# Patient Record
Sex: FEMALE | Race: WHITE | Employment: OTHER | URBAN - METROPOLITAN AREA
[De-identification: names, ages, dates, MRNs, and addresses within clinical notes are randomized per-mention and may not be internally consistent; named-entity substitution may affect disease eponyms.]

---

## 2018-11-29 ENCOUNTER — HOSPITAL ENCOUNTER (OUTPATIENT)
Dept: WOMENS IMAGING | Age: 68
Discharge: HOME | End: 2018-12-01
Payer: MEDICARE

## 2018-11-29 DIAGNOSIS — M81.6 LOCALIZED OSTEOPOROSIS, UNSPECIFIED PATHOLOGICAL FRACTURE PRESENCE: ICD-10-CM

## 2018-11-29 DIAGNOSIS — Z12.39 BREAST CANCER SCREENING: ICD-10-CM

## 2018-11-29 PROCEDURE — 77080 DXA BONE DENSITY AXIAL: CPT

## 2018-11-29 PROCEDURE — 77067 SCR MAMMO BI INCL CAD: CPT

## 2019-09-12 ENCOUNTER — HOSPITAL ENCOUNTER (OUTPATIENT)
Dept: GENERAL RADIOLOGY | Age: 69
Discharge: HOME | End: 2019-09-14
Payer: MEDICARE

## 2019-09-12 DIAGNOSIS — M54.5 LOW BACK PAIN, UNSPECIFIED BACK PAIN LATERALITY, UNSPECIFIED CHRONICITY, WITH SCIATICA PRESENCE UNSPECIFIED: ICD-10-CM

## 2019-09-12 PROCEDURE — 72110 X-RAY EXAM L-2 SPINE 4/>VWS: CPT

## 2019-11-08 ENCOUNTER — OFFICE VISIT (OUTPATIENT)
Dept: GERIATRIC MEDICINE | Age: 69
End: 2019-11-08
Payer: MEDICARE

## 2019-11-08 DIAGNOSIS — M48.061 SPINAL STENOSIS OF LUMBAR REGION WITHOUT NEUROGENIC CLAUDICATION: Primary | ICD-10-CM

## 2019-11-08 DIAGNOSIS — G35 MS (MULTIPLE SCLEROSIS) (HCC): ICD-10-CM

## 2019-11-08 DIAGNOSIS — I95.1 ORTHOSTASIS: ICD-10-CM

## 2019-11-08 DIAGNOSIS — M15.9 OSTEOARTHRITIS OF MULTIPLE JOINTS, UNSPECIFIED OSTEOARTHRITIS TYPE: ICD-10-CM

## 2019-11-08 PROCEDURE — 1123F ACP DISCUSS/DSCN MKR DOCD: CPT | Performed by: INTERNAL MEDICINE

## 2019-11-08 PROCEDURE — 99305 1ST NF CARE MODERATE MDM 35: CPT | Performed by: INTERNAL MEDICINE

## 2019-11-08 PROCEDURE — G8484 FLU IMMUNIZE NO ADMIN: HCPCS | Performed by: INTERNAL MEDICINE

## 2019-11-11 LAB
ANION GAP SERPL CALCULATED.3IONS-SCNC: 9 MEQ/L (ref 9–15)
BUN BLDV-MCNC: 13 MG/DL (ref 8–23)
CALCIUM SERPL-MCNC: 9.4 MG/DL (ref 8.5–9.9)
CHLORIDE BLD-SCNC: 100 MEQ/L (ref 95–107)
CO2: 31 MEQ/L (ref 20–31)
CREAT SERPL-MCNC: 0.37 MG/DL (ref 0.5–0.9)
GFR AFRICAN AMERICAN: >60
GFR NON-AFRICAN AMERICAN: >60
GLUCOSE BLD-MCNC: 91 MG/DL (ref 70–99)
HCT VFR BLD CALC: 38.8 % (ref 37–47)
HEMOGLOBIN: 12.7 G/DL (ref 12–16)
MCH RBC QN AUTO: 31.2 PG (ref 27–31.3)
MCHC RBC AUTO-ENTMCNC: 32.8 % (ref 33–37)
MCV RBC AUTO: 95.1 FL (ref 82–100)
PDW BLD-RTO: 12.4 % (ref 11.5–14.5)
PLATELET # BLD: 274 K/UL (ref 130–400)
POTASSIUM SERPL-SCNC: 4.7 MEQ/L (ref 3.4–4.9)
RBC # BLD: 4.08 M/UL (ref 4.2–5.4)
SODIUM BLD-SCNC: 140 MEQ/L (ref 135–144)
WBC # BLD: 3.8 K/UL (ref 4.8–10.8)

## 2019-11-27 VITALS — HEART RATE: 68 BPM | RESPIRATION RATE: 14 BRPM | TEMPERATURE: 98.6 F

## 2019-12-09 ENCOUNTER — HOSPITAL ENCOUNTER (OUTPATIENT)
Dept: WOMENS IMAGING | Age: 69
Discharge: HOME | End: 2019-12-11
Payer: MEDICARE

## 2019-12-09 DIAGNOSIS — Z12.31 BREAST CANCER SCREENING BY MAMMOGRAM: ICD-10-CM

## 2019-12-09 PROCEDURE — 77067 SCR MAMMO BI INCL CAD: CPT

## 2022-06-20 ENCOUNTER — HOSPITAL ENCOUNTER (EMERGENCY)
Age: 72
Discharge: HOME | End: 2022-06-20
Attending: EMERGENCY MEDICINE
Payer: MEDICARE

## 2022-06-20 ENCOUNTER — APPOINTMENT (OUTPATIENT)
Dept: CT IMAGING | Age: 72
End: 2022-06-20
Payer: MEDICARE

## 2022-06-20 VITALS
OXYGEN SATURATION: 99 % | SYSTOLIC BLOOD PRESSURE: 138 MMHG | HEIGHT: 66 IN | RESPIRATION RATE: 18 BRPM | WEIGHT: 121 LBS | DIASTOLIC BLOOD PRESSURE: 70 MMHG | TEMPERATURE: 98 F | BODY MASS INDEX: 19.44 KG/M2 | HEART RATE: 81 BPM

## 2022-06-20 DIAGNOSIS — S33.5XXA LUMBAR SPRAIN, INITIAL ENCOUNTER: Primary | ICD-10-CM
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
## 2022-06-20 PROCEDURE — 99284 EMERGENCY DEPT VISIT MOD MDM: CPT

## 2022-06-20 PROCEDURE — 96372 THER/PROPH/DIAG INJ SC/IM: CPT

## 2022-06-20 PROCEDURE — 72131 CT LUMBAR SPINE W/O DYE: CPT

## 2022-06-20 PROCEDURE — 6360000002 HC RX W HCPCS: Performed by: EMERGENCY MEDICINE

## 2022-06-20 RX ORDER — KETOROLAC TROMETHAMINE 30 MG/ML
30 INJECTION, SOLUTION INTRAMUSCULAR; INTRAVENOUS ONCE
Status: COMPLETED | OUTPATIENT
Start: 2022-06-20 | End: 2022-06-20

## 2022-06-20 RX ORDER — KETOROLAC TROMETHAMINE 10 MG/1
10 TABLET, FILM COATED ORAL EVERY 6 HOURS PRN
Qty: 20 TABLET | Refills: 0 | Status: SHIPPED | OUTPATIENT
Start: 2022-06-20

## 2022-06-20 RX ORDER — FLUDROCORTISONE ACETATE 0.1 MG/1
0.1 TABLET ORAL DAILY
COMMUNITY

## 2022-06-20 RX ADMIN — KETOROLAC TROMETHAMINE 30 MG: 30 INJECTION, SOLUTION INTRAMUSCULAR; INTRAVENOUS at 21:33

## 2022-06-20 ASSESSMENT — ENCOUNTER SYMPTOMS
DIARRHEA: 0
PHOTOPHOBIA: 0
BACK PAIN: 0
WHEEZING: 0
CONSTIPATION: 0
APNEA: 0
ABDOMINAL DISTENTION: 0
SINUS PRESSURE: 0
VOMITING: 0
COLOR CHANGE: 0
RHINORRHEA: 0
NAUSEA: 0
SORE THROAT: 0
SHORTNESS OF BREATH: 0
COUGH: 0
ABDOMINAL PAIN: 0
EYE PAIN: 0

## 2022-06-20 ASSESSMENT — PAIN - FUNCTIONAL ASSESSMENT: PAIN_FUNCTIONAL_ASSESSMENT: 0-10

## 2022-06-20 ASSESSMENT — PAIN DESCRIPTION - ORIENTATION
ORIENTATION: LEFT
ORIENTATION: LEFT

## 2022-06-20 ASSESSMENT — PAIN DESCRIPTION - DESCRIPTORS
DESCRIPTORS: SHARP
DESCRIPTORS: SHARP;SHOOTING

## 2022-06-20 ASSESSMENT — PAIN SCALES - GENERAL
PAINLEVEL_OUTOF10: 5
PAINLEVEL_OUTOF10: 4

## 2022-06-20 ASSESSMENT — PAIN DESCRIPTION - LOCATION
LOCATION: SACRUM
LOCATION: SACRUM

## 2022-06-20 ASSESSMENT — PAIN DESCRIPTION - PAIN TYPE: TYPE: ACUTE PAIN

## 2022-06-21 NOTE — ED PROVIDER NOTES
[facility] ED  eMERGENCY dEPARTMENT eNCOUnter      Pt Name: Sulaiman Fitch  MRN: [id number]  [place] 1950  Date of evaluation: 6/20/2022  Provider: Toribio Maza MD    CHIEF COMPLAINT       Chief Complaint   Patient presents with   [facility] Fall     Was walking with rollator and lost balance and fell backwards and landed on butt. Now c/o pain in the left sacral area with numbness but states \"Thats not new, I always have numbness due to the MS. \"         HISTORY OF PRESENT ILLNESS   (Location/Symptom, Timing/Onset,Context/Setting, Quality, Duration, Modifying Factors, Severity)  Note limiting factors. Sulaiman Fitch is a 80 y.o. female who presents to the emergency department with complaint of left lower back pain following a fall at home 1:30 PM this afternoon. Patient has history of MS and has had issues with recurrent falls. She lost balance and fell backwards landing on her buttocks. Denies head injury with the fall. Pain was not immediate. She however after a few hours was not able to stand for long periods without shooting pain down the left leg. Pain is 5 on a scale of 1-10 and sharp. Has issues with extremity weakness and numbness but left side has gotten worse with the fall. HPI    Nursing Notes were reviewed. REVIEW OF SYSTEMS    (2-9 systems for level 4, 10 or more for level 5)     Review of Systems   Constitutional: Negative. Negative for activity change, appetite change, chills, fatigue and fever. HENT: Negative for congestion, ear discharge, ear pain, hearing loss, rhinorrhea, sinus pressure and sore throat. Eyes: Negative for photophobia, pain and visual disturbance. Respiratory: Negative for apnea, cough, shortness of breath and wheezing. Cardiovascular: Negative for chest pain, palpitations and leg swelling. Gastrointestinal: Negative for abdominal distention, abdominal pain, constipation, diarrhea, nausea and vomiting.    Endocrine: Negative for cold intolerance, heat intolerance and polyuria. Genitourinary: Negative for dysuria, flank pain, frequency and urgency. Musculoskeletal: Positive for arthralgias and gait problem. Negative for back pain, myalgias and neck stiffness. Skin: Negative for color change, pallor, rash and wound. Allergic/Immunologic: Negative for food allergies and immunocompromised state. Neurological: Negative for dizziness, tremors, syncope, weakness, light-headedness and headaches. Hematological: Negative for adenopathy. Does not bruise/bleed easily. Psychiatric/Behavioral: Negative for agitation, confusion, hallucinations, self-injury, sleep disturbance and suicidal ideas. The patient is not hyperactive. All other systems reviewed and are negative. Except as noted above the remainder of the review of systems was reviewed and negative. PAST MEDICAL HISTORY     Past Medical History:   Diagnosis Date    Multiple sclerosis ([facility] [address]          SURGICAL HISTORY     No past surgical history on file. CURRENT MEDICATIONS       Previous Medications    FLUDROCORTISONE (FLORINEF) 0.1 MG TABLET    Take 0.1 mg by mouth daily       ALLERGIES     Sulfa antibiotics    FAMILY HISTORY     No family history on file. SOCIAL HISTORY       Social History     Socioeconomic History    Marital status:       Spouse name: Not on file    Number of children: Not on file    Years of education: Not on file    Highest education level: Not on file   Occupational History    Not on file   Tobacco Use    Smoking status: Not on file    Smokeless tobacco: Not on file   Substance and Sexual Activity    Alcohol use: Not on file    Drug use: Not on file    Sexual activity: Not on file   Other Topics Concern    Not on file   Social History Narrative    Not on file     Social Determinants of Health     Financial Resource Strain:     Difficulty of Paying Living Expenses: Not on file   Food Insecurity:     Worried About Running Out of Food in the Last Year: Not on file    Ran Out of Food in the Last Year: Not on file   Transportation Needs:     Lack of Transportation (Medical): Not on file    Lack of Transportation (Non-Medical): Not on file   Physical Activity:     Days of Exercise per Week: Not on file    Minutes of Exercise per Session: Not on file   Stress:     Feeling of Stress : Not on file   Social Connections:     Frequency of Communication with Friends and Family: Not on file    Frequency of Social Gatherings with Friends and Family: Not on file    Attends Latter day Services: Not on file    Active Member of [address] [place] [address] [employer] or Organizations: Not on file    Attends Club or Organization Meetings: Not on file    Marital Status: Not on file   Intimate Partner Violence:     Fear of Current or Ex-Partner: Not on file    Emotionally Abused: Not on file    Physically Abused: Not on file    Sexually Abused: Not on file   Housing Stability:     Unable to Pay for Housing in the Last Year: Not on file    Number of [place] in the Last Year: Not on file    Unstable Housing in the Last Year: Not on file       SCREENINGS    [place] Coma Scale  Eye Opening: Spontaneous  Best Verbal Response: Oriented  Best Motor Response: Obeys commands  [place] Coma Scale Score: 15        PHYSICAL EXAM    (up to 7 for level 4, 8 or more for level 5)     ED Triage Vitals [06/20/22 1933]   BP Temp Temp src Heart Rate Resp SpO2 Height Weight   (!) 146/72 98 °F (36.7 °C) -- 79 18 -- 5' 6\" (1.676 m) 121 lb (54.9 kg)       Physical Exam  Vitals and nursing note reviewed. Constitutional:       General: She is not in acute distress. Appearance: Normal appearance. She is well-developed and normal weight. She is not ill-appearing, toxic-appearing or diaphoretic. HENT:      Head: Normocephalic and atraumatic. Nose: Nose normal. No congestion or rhinorrhea. Mouth/Throat:      Mouth: Mucous membranes are moist.      Pharynx: Oropharynx is clear. No oropharyngeal exudate or posterior oropharyngeal erythema. Eyes:      General: No scleral icterus. Right eye: No discharge. Left eye: No discharge. Extraocular Movements: Extraocular movements intact. Conjunctiva/sclera: Conjunctivae normal.      Pupils: Pupils are equal, round, and reactive to light. Neck:      Thyroid: No thyromegaly. Vascular: No carotid bruit or JVD. Trachea: No tracheal deviation. Cardiovascular:      Rate and Rhythm: Normal rate and regular rhythm. Pulses: Normal pulses. Heart sounds: Normal heart sounds. No murmur heard. No friction rub. No gallop. Pulmonary:      Effort: Pulmonary effort is normal. No respiratory distress. Breath sounds: Normal breath sounds. No stridor. No wheezing, rhonchi or rales. Chest:      Chest wall: No tenderness. Abdominal:      General: Abdomen is flat. Bowel sounds are normal. There is no distension. Palpations: Abdomen is soft. There is no mass. Tenderness: There is no abdominal tenderness. There is no right CVA tenderness, left CVA tenderness, guarding or rebound. Hernia: No hernia is present. Musculoskeletal:         General: No swelling, tenderness, deformity or signs of injury. Normal range of motion. Cervical back: Normal range of motion and neck supple. No rigidity or tenderness. Right lower leg: No edema. Left lower leg: No edema. Lymphadenopathy:      Cervical: No cervical adenopathy. Skin:     General: Skin is warm and dry. Capillary Refill: Capillary refill takes less than 2 seconds. Coloration: Skin is not jaundiced or pale. Findings: No bruising, erythema, lesion or rash. Neurological:      General: No focal deficit present. Mental Status: She is alert and oriented to person, place, and time. Mental status is at baseline. Cranial Nerves: No cranial nerve deficit. Sensory: No sensory deficit.       Motor: No weakness or abnormal muscle tone. Coordination: Coordination normal.      Gait: Gait abnormal.      Deep Tendon Reflexes: Reflexes are normal and symmetric. Reflexes normal.   Psychiatric:         Mood and Affect: Mood normal.         Behavior: Behavior normal.         Thought Content: Thought content normal.         Judgment: Judgment normal.         DIAGNOSTIC RESULTS     EKG: All EKG's are interpreted by the Emergency Department Physician who either signs or Co-signs this chart in the absence of a cardiologist.        RADIOLOGY:   Non-plain film images such as CT, Ultrasound and MRI are read by the radiologist. Diane Ariza radiographicimages are visualized and preliminarily interpreted by the emergency physician with the below findings:        Interpretation per the Radiologist below, if available at the time of this note:    Severo    (Results Pending)         ED BEDSIDE ULTRASOUND:   Performed by ED Physician - none    LABS:  Labs Reviewed - No data to display    All other labs were within normal range or not returned as of this dictation. EMERGENCY DEPARTMENT COURSE and DIFFERENTIALDIAGNOSIS/MDM:   Vitals:    Vitals:    06/20/22 1933   BP: (!) 146/72   Pulse: 79   Resp: 18   Temp: 98 °F (36.7 °C)   Weight: 121 lb (54.9 kg)   Height: 5' 6\" (1.676 m)           MDM  Number of Diagnoses or Management Options     Amount and/or Complexity of Data Reviewed  Tests in the radiology section of CPT®: reviewed and ordered    Risk of Complications, Morbidity, and/or Mortality  Presenting problems: moderate  Diagnostic procedures: moderate  Management options: moderate    Patient Progress  Patient progress: improved      CRITICAL CARE TIME   Total Critical Care time was  minutes, excluding separately reportable procedures. There was a high probability of clinically significant/life threatening deterioration in the patient's condition which required my urgentintervention. CONSULTS:  None    PROCEDURES:  Unless otherwise noted below, none     Procedures    FINAL IMPRESSION      1. Lumbar sprain, initial encounter    2.  Fall, initial encounter          DISPOSITION/PLAN   DISPOSITION Decision To Discharge 06/20/2022 10:15:40 PM      PATIENT REFERRED TO:  Jamie Bautista MD  [address]  Aravind Moore [address]  [phone]    In 3 days        DISCHARGE MEDICATIONS:  New Prescriptions    KETOROLAC (TORADOL) 10 MG TABLET    Take 1 tablet by mouth every 6 hours as needed for Pain          (Please note that portions of this note were completed with a voice recognitionprogram.  Efforts were made to edit the dictations but occasionally words are mis-transcribed.)    Genevieve Huggins MD (electronically signed)  Attending Emergency Physician          Genevieve Huggins MD  06/20/22 2383

## 2022-11-01 ENCOUNTER — OFFICE VISIT (OUTPATIENT)
Dept: GERIATRIC MEDICINE | Age: 72
End: 2022-11-01
Payer: MEDICARE

## 2022-11-01 DIAGNOSIS — G35 MS (MULTIPLE SCLEROSIS) (HCC): ICD-10-CM

## 2022-11-01 PROCEDURE — 1090F PRES/ABSN URINE INCON ASSESS: CPT | Performed by: INTERNAL MEDICINE

## 2022-11-01 PROCEDURE — 1123F ACP DISCUSS/DSCN MKR DOCD: CPT | Performed by: INTERNAL MEDICINE

## 2022-11-01 PROCEDURE — G8484 FLU IMMUNIZE NO ADMIN: HCPCS | Performed by: INTERNAL MEDICINE

## 2022-11-01 PROCEDURE — G8420 CALC BMI NORM PARAMETERS: HCPCS | Performed by: INTERNAL MEDICINE

## 2022-11-15 ENCOUNTER — OFFICE VISIT (OUTPATIENT)
Dept: GERIATRIC MEDICINE | Age: 72
End: 2022-11-15
Payer: MEDICARE

## 2022-11-15 DIAGNOSIS — R53.1 WEAKNESS: ICD-10-CM

## 2022-11-15 DIAGNOSIS — G35 MS (MULTIPLE SCLEROSIS) (HCC): Primary | ICD-10-CM

## 2022-11-15 DIAGNOSIS — I95.1 ORTHOSTASIS: ICD-10-CM

## 2022-11-15 DIAGNOSIS — R63.4 WEIGHT LOSS: ICD-10-CM

## 2022-11-15 DIAGNOSIS — G89.4 CHRONIC PAIN SYNDROME: ICD-10-CM

## 2022-11-15 DIAGNOSIS — M48.02 CERVICAL SPINAL STENOSIS: ICD-10-CM

## 2022-11-15 PROCEDURE — G8484 FLU IMMUNIZE NO ADMIN: HCPCS | Performed by: INTERNAL MEDICINE

## 2022-11-15 PROCEDURE — 1090F PRES/ABSN URINE INCON ASSESS: CPT | Performed by: INTERNAL MEDICINE

## 2022-11-15 PROCEDURE — 1123F ACP DISCUSS/DSCN MKR DOCD: CPT | Performed by: INTERNAL MEDICINE

## 2022-11-15 PROCEDURE — G8420 CALC BMI NORM PARAMETERS: HCPCS | Performed by: INTERNAL MEDICINE

## 2022-12-01 PROBLEM — G35 MS (MULTIPLE SCLEROSIS) (HCC): Status: ACTIVE | Noted: 2022-12-01

## 2022-12-01 PROBLEM — R63.4 WEIGHT LOSS: Status: ACTIVE | Noted: 2022-12-01

## 2022-12-01 PROBLEM — M15.9 OSTEOARTHRITIS OF MULTIPLE JOINTS: Status: ACTIVE | Noted: 2022-12-01

## 2022-12-01 PROBLEM — R25.1 TREMOR: Status: ACTIVE | Noted: 2022-12-01

## 2022-12-01 NOTE — PROGRESS NOTES
SUBJECTIVE:  67-year-old woman transfer service seen today's visit for review of her multiple sclerosis. Patient has ongoing concerns regarding transfer of care follow-up with neurology. Patient has had a functional decline and is currently asking whether or not she can be seen in house possibility of local neurological care. Patient has not any acute flare at this time      ROS: Slight tremor without chest palpitation  The rest of the 14 point ROS negative    PHYSICAL EXAM: VSS per facility record  Frail appearance pupils are small oral mucosa was moist oral mucosa moist chest showed no crackles no wheezing cardiovascular showed a regular rate abdomen soft nontender    ASSESSMENT & PLAN:   Diagnosis Orders   1. MS (multiple sclerosis) ([facility])            Currently not on disease modifying medications will refer back to neurology for evaluation with outpatient benefit from immune modulating agent          Past Medical History:   Diagnosis Date    Chronic pain syndrome     Multiple sclerosis ([facility] [address]     Orthostatic hypotension     Osteoporosis 05/29/2018    age-related w/o current pathological fracture    Other lack of coordination     Spinal stenosis, cervical region     Unspecified retinal detachment with retinal break, left eye          No past surgical history on file. Current Outpatient Medications on File Prior to Visit   Medication Sig Dispense Refill    fludrocortisone (FLORINEF) 0.1 MG tablet Take 0.1 mg by mouth daily      ketorolac (TORADOL) 10 MG tablet Take 1 tablet by mouth every 6 hours as needed for Pain 20 tablet 0     No current facility-administered medications on file prior to visit. No family history on file. Social History     Socioeconomic History    Marital status:       Spouse name: Not on file    Number of children: Not on file    Years of education: Not on file    Highest education level: Not on file   Occupational History    Not on file   Tobacco Use    Smoking status: Not on file    Smokeless tobacco: Not on file   Substance and Sexual Activity    Alcohol use: Not on file    Drug use: Not on file    Sexual activity: Not on file   Other Topics Concern    Not on file   Social History Narrative    Not on file     Social Determinants of Health     Financial Resource Strain: Not on file   Food Insecurity: Not on file   Transportation Needs: Not on file   Physical Activity: Not on file   Stress: Not on file   Social Connections: Not on file   Intimate Partner Violence: Not on file   Housing Stability: Not on file         Lab Results   Component Value Date    LABA1C 5.3 09/01/2022     No results found for: EAG    Lab Results   Component Value Date/Time     11/04/2022 09:47 AM    K 4.2 11/04/2022 09:47 AM     11/04/2022 09:47 AM    CO2 24 11/04/2022 09:47 AM    BUN 19 11/04/2022 09:47 AM    CREATININE 0.72 11/04/2022 09:47 AM    GLUCOSE 84 11/04/2022 09:47 AM    CALCIUM 9.1 11/04/2022 09:47 AM        Lab Results   Component Value Date    CHOL 208 (H) 09/01/2022    CHOL 210 (H) 02/01/2022    CHOL 173 08/11/2020     Lab Results   Component Value Date    TRIG 100 09/01/2022    TRIG 217 (H) 02/01/2022    TRIG 71 08/11/2020     Lab Results   Component Value Date    HDL 72 (H) 09/01/2022    HDL 62 (H) 02/01/2022    HDL 66 (H) 08/11/2020     Lab Results   Component Value Date    LDLCALC 116 09/01/2022    LDLCALC 105 02/01/2022    LDLCALC 93 08/11/2020     No results found for: LABVLDL, VLDL  No results found for: [facility]    Lab Results   Component Value Date    TSH 1.160 08/11/2020       Lab Results   Component Value Date    WBC 3.5 (L) 09/01/2022    HGB 14.3 09/01/2022    HCT 44.1 09/01/2022    MCV 97.0 09/01/2022     09/01/2022       Please note orders entered on site at facility after discussion with appropriate facility nursing/therapy/ / nutritional staff. Current longstanding medical problems and acute medical issues addressed with staff.  Available data and data elements in on site paper chart reviewed and analyzed. Current external consultant notes reviewed in on site chart. Ordered laboratory testing and imaging will be reviewed when available.

## 2022-12-06 ENCOUNTER — OFFICE VISIT (OUTPATIENT)
Dept: GERIATRIC MEDICINE | Age: 72
End: 2022-12-06
Payer: MEDICARE

## 2022-12-06 DIAGNOSIS — R25.1 TREMOR: ICD-10-CM

## 2022-12-06 DIAGNOSIS — R63.4 WEIGHT LOSS: ICD-10-CM

## 2022-12-06 DIAGNOSIS — M15.9 OSTEOARTHRITIS OF MULTIPLE JOINTS, UNSPECIFIED OSTEOARTHRITIS TYPE: ICD-10-CM

## 2022-12-06 DIAGNOSIS — G35 MS (MULTIPLE SCLEROSIS) (HCC): Primary | ICD-10-CM

## 2022-12-06 DIAGNOSIS — R26.81 UNSTEADINESS: ICD-10-CM

## 2022-12-06 DIAGNOSIS — M48.02 CERVICAL SPINAL STENOSIS: ICD-10-CM

## 2022-12-06 PROCEDURE — 1123F ACP DISCUSS/DSCN MKR DOCD: CPT | Performed by: INTERNAL MEDICINE

## 2022-12-06 PROCEDURE — G8420 CALC BMI NORM PARAMETERS: HCPCS | Performed by: INTERNAL MEDICINE

## 2022-12-06 PROCEDURE — G8484 FLU IMMUNIZE NO ADMIN: HCPCS | Performed by: INTERNAL MEDICINE

## 2022-12-06 PROCEDURE — 1090F PRES/ABSN URINE INCON ASSESS: CPT | Performed by: INTERNAL MEDICINE

## 2022-12-15 PROBLEM — H33.22 LEFT RETINAL DETACHMENT: Status: ACTIVE | Noted: 2022-12-15

## 2022-12-15 PROBLEM — M48.02 CERVICAL SPINAL STENOSIS: Status: ACTIVE | Noted: 2022-12-15

## 2022-12-15 PROBLEM — I95.1 ORTHOSTASIS: Status: ACTIVE | Noted: 2022-12-15

## 2022-12-15 PROBLEM — G89.4 CHRONIC PAIN SYNDROME: Status: ACTIVE | Noted: 2022-12-15

## 2022-12-15 ASSESSMENT — ENCOUNTER SYMPTOMS
CONSTIPATION: 1
COUGH: 1

## 2022-12-15 NOTE — PROGRESS NOTES
Patient Name: Ld Nelson    YOB: 1950  Medical Record Number: [id number]      History of Present Illness: This 30-year-old woman transferred to our service from Dr. Morro Adams. Patient has been functionally dependent in the past however has ongoing decline she lives in assisted living at this time within the facility. Patient has had progressive multiple sclerosis. She is still mobile however is globally unsteady patient has had near falls. Has had falls in the last year. Without acute significant head injury. Patient is largely pain-free. She does have some intermittent tremoring. Patient has been followed by neurology in the past.  Has been previously on disease modifying medication. Has compounded issues of cervical stenosis weakness orthostasis patient has had a prior ischemic stroke. Has had lumbar stenosis. Patient has been cognizant of her diet has been attempting nonpharmacological interventions to ameliorate her multiple sclerosis. Function stable without evidence of aggressive flare at this time. She has had questionable subjective decreasing functional extremities. There is no concern for myelopathy and lumbar region? A lot of contraindicated. Patient currently is functionally stable. Pain-free patient is cognitively intact at her baseline. Review of Systems   Constitutional:  Positive for activity change, fatigue and unexpected weight change. Negative for appetite change. HENT:  Negative for congestion. Respiratory:  Positive for cough. Cardiovascular:  Negative for leg swelling. Gastrointestinal:  Positive for constipation. Musculoskeletal:  Positive for gait problem and myalgias. Skin:  Positive for pallor. Neurological:  Positive for tremors and weakness. Psychiatric/Behavioral:  Negative for agitation, behavioral problems and confusion. All other systems reviewed and are negative.     Review of Systems: All 14 review of systems negative other than as stated above           Past Medical History:   Diagnosis Date    Chronic pain syndrome     Multiple sclerosis (HCC)     Orthostatic hypotension     Osteoporosis 05/29/2018    age-related w/o current pathological fracture    Other lack of coordination     Spinal stenosis, cervical region     Unspecified retinal detachment with retinal break, left eye            No past surgical history on file. Current Outpatient Medications on File Prior to Visit   Medication Sig Dispense Refill    fludrocortisone (FLORINEF) 0.1 MG tablet Take 0.1 mg by mouth daily      ketorolac (TORADOL) 10 MG tablet Take 1 tablet by mouth every 6 hours as needed for Pain 20 tablet 0     No current facility-administered medications on file prior to visit. Allergies   Allergen Reactions    Sulfa Antibiotics Rash         No family history on file. Physical Exam:      Physical Exam  Vitals and nursing note reviewed. Constitutional:       Appearance: Normal appearance. HENT:      Head: Normocephalic and atraumatic. Nose: Nose normal.      Mouth/Throat:      Mouth: Mucous membranes are moist.   Eyes:      Pupils: Pupils are equal, round, and reactive to light. Cardiovascular:      Rate and Rhythm: Normal rate and regular rhythm. Pulses: Normal pulses. Pulmonary:      Effort: Pulmonary effort is normal.      Breath sounds: No wheezing. Abdominal:      General: Abdomen is flat. Bowel sounds are normal.      Tenderness: There is no abdominal tenderness. Musculoskeletal:         General: No swelling. Cervical back: Neck supple. Lymphadenopathy:      Cervical: No cervical adenopathy. Skin:     General: Skin is warm. Findings: No bruising. Neurological:      Sensory: Sensory deficit present. Motor: Weakness present. Gait: Gait abnormal.   Psychiatric:         Mood and Affect: Mood normal.       There were no vitals taken for this visit.       .   Lab Results   Component Value Date    WBC 3.5 (L) 09/01/2022    HGB 14.3 09/01/2022    HCT 44.1 09/01/2022    MCV 97.0 09/01/2022     09/01/2022     Lab Results   Component Value Date/Time     11/04/2022 09:47 AM    K 4.2 11/04/2022 09:47 AM     11/04/2022 09:47 AM    CO2 24 11/04/2022 09:47 AM    BUN 19 11/04/2022 09:47 AM    CREATININE 0.72 11/04/2022 09:47 AM    GLUCOSE 84 11/04/2022 09:47 AM    CALCIUM 9.1 11/04/2022 09:47 AM                ASSESSMENT:  Patient Active Problem List   Diagnosis    MS (multiple sclerosis) ([facility])    Tremor    Osteoarthritis of multiple joints    Weight loss    Cervical spinal stenosis    Orthostasis    Left retinal detachment    Chronic pain syndrome         PLAN:   Diagnosis Orders   1. MS (multiple sclerosis) ([address]        2. Cervical spinal stenosis        3. Orthostasis        4. Chronic pain syndrome        5. Weight loss        6. Weakness          Currently functionally stable encourage nutritional intake encourage passive range of motion as able ongoing falls precautions. Will attempt to patient follow-up with neurology locally to facilitate ease of transfer. Pain patient currently not on disease modifying or biological agents at this time will defer to neurology regarding possibly repeat for Avonex. Patient is functionally stable with stable. Discussed long-term strategies for managing her current impairment. Remains compliant with nursing instructions remains compliant with general health maintenance we will follow clinically    Please note orders entered on site at facility after discussion with appropriate facility nursing/therapy/ / nutritional staff. Current longstanding medical problems and acute medical issues addressed with staff. Available data and data elements in on site paper chart reviewed and analyzed. Current external consultant notes reviewed in on site chart. Ordered laboratory testing and imaging will be reviewed when available.    This patient's need for psychiatric medication has been reviewed. Will consider further adjustment and possible further evaluations by mental health services.

## 2023-01-04 ASSESSMENT — ENCOUNTER SYMPTOMS
COUGH: 1
CONSTIPATION: 1

## 2023-01-04 NOTE — PROGRESS NOTES
Patient Name: Kishan Diallo    YOB: 1950  Medical Record Number: [id number]    History of Present Illness:  69-year-old woman seen in her room today patient transfer service of Dr. Julianne Reed patient has advancing multiple sclerosis patient has been functionally stable with increasing tremors and some unsteadiness. Patient has had increasing difficulty difficulty being able to go to appointments. Patient has been fairly well controlled in terms of her multiple strokes without evidence acute flare at this time. Patient is currently not on disease modifying medications. Patient has been experiencing increased status near falls. Patient has had some loss of bladder control intermittent bowel control patient is able to function maintain her self current level of independence patient is pain-free at this time no acute cognitive impairment. Patient is socially where and has meaningful engagements. She is pain-free at this time. Review of Systems   Constitutional:  Positive for fatigue. HENT:  Negative for congestion. Respiratory:  Positive for cough. Cardiovascular:  Negative for leg swelling. Gastrointestinal:  Positive for constipation. Musculoskeletal:  Positive for gait problem and myalgias. Skin:  Negative for pallor. Neurological:  Positive for tremors, weakness and numbness. All other systems reviewed and are negative. Review of Systems: All 14 review of systems negative other than as stated above           Past Medical History:   Diagnosis Date    Chronic pain syndrome     Multiple sclerosis (HCC)     Orthostatic hypotension     Osteoporosis 05/29/2018    age-related w/o current pathological fracture    Other lack of coordination     Spinal stenosis, cervical region     Unspecified retinal detachment with retinal break, left eye            No past surgical history on file.       Current Outpatient Medications on File Prior to Visit   Medication Sig Dispense Refill fludrocortisone (FLORINEF) 0.1 MG tablet Take 0.1 mg by mouth daily      ketorolac (TORADOL) 10 MG tablet Take 1 tablet by mouth every 6 hours as needed for Pain 20 tablet 0     No current facility-administered medications on file prior to visit. Allergies   Allergen Reactions    Sulfa Antibiotics Rash         No family history on file. Physical Exam:      Physical Exam  Vitals and nursing note reviewed. Constitutional:       Appearance: Normal appearance. She is normal weight. HENT:      Head: Normocephalic and atraumatic. Nose: Nose normal.      Mouth/Throat:      Mouth: Mucous membranes are moist.   Eyes:      Extraocular Movements: Extraocular movements intact. Cardiovascular:      Rate and Rhythm: Normal rate and regular rhythm. Pulses: Normal pulses. Pulmonary:      Effort: Pulmonary effort is normal.      Breath sounds: No wheezing. Abdominal:      General: Bowel sounds are normal.      Palpations: Abdomen is soft. Musculoskeletal:         General: Swelling present. Cervical back: Normal range of motion. Skin:     General: Skin is warm. Neurological:      Mental Status: Mental status is at baseline. Motor: Weakness present. Gait: Gait abnormal.   Psychiatric:         Mood and Affect: Mood normal.       There were no vitals taken for this visit.       .   Lab Results   Component Value Date    WBC 3.5 (L) 09/01/2022    HGB 14.3 09/01/2022    HCT 44.1 09/01/2022    MCV 97.0 09/01/2022     09/01/2022     Lab Results   Component Value Date/Time     11/04/2022 09:47 AM    K 4.2 11/04/2022 09:47 AM     11/04/2022 09:47 AM    CO2 24 11/04/2022 09:47 AM    BUN 19 11/04/2022 09:47 AM    CREATININE 0.72 11/04/2022 09:47 AM    GLUCOSE 84 11/04/2022 09:47 AM    CALCIUM 9.1 11/04/2022 09:47 AM                ASSESSMENT:  Patient Active Problem List   Diagnosis    MS (multiple sclerosis) (HCC)    Tremor    Osteoarthritis of multiple joints    Weight loss Cervical spinal stenosis    Orthostasis    Left retinal detachment    Chronic pain syndrome         PLAN:   Diagnosis Orders   1. MS (multiple sclerosis) ([facility] [address]        2. Cervical spinal stenosis        3. Osteoarthritis of multiple joints, unspecified osteoarthritis type        4. Tremor        5. Weight loss        6. Unsteadiness            Following up blood work pending. Nutrition support. Skin surveillance. Falls precautions. Will need follow-up with neurology reestablish with local neurologist as able. Review medications at this time. Continue current pain regimen. Nutritional support. Skin surveillance. Please note orders entered on site at facility after discussion with appropriate facility nursing/therapy/ / nutritional staff. Current longstanding medical problems and acute medical issues addressed with staff. Available data and data elements in on site paper chart reviewed and analyzed. Current external consultant notes reviewed in on site chart. Ordered laboratory testing and imaging will be reviewed when available. This patient's need for psychiatric medication has been reviewed. Will consider further adjustment and possible further evaluations by mental health services.

## 2023-02-07 ENCOUNTER — OFFICE VISIT (OUTPATIENT)
Dept: GERIATRIC MEDICINE | Age: 73
End: 2023-02-07
Payer: MEDICARE

## 2023-02-07 DIAGNOSIS — Z00.00 INITIAL MEDICARE ANNUAL WELLNESS VISIT: Primary | ICD-10-CM

## 2023-02-07 PROCEDURE — G8484 FLU IMMUNIZE NO ADMIN: HCPCS | Performed by: INTERNAL MEDICINE

## 2023-02-07 PROCEDURE — 1123F ACP DISCUSS/DSCN MKR DOCD: CPT | Performed by: INTERNAL MEDICINE

## 2023-02-07 PROCEDURE — 3017F COLORECTAL CA SCREEN DOC REV: CPT | Performed by: INTERNAL MEDICINE

## 2023-02-07 PROCEDURE — G0438 PPPS, INITIAL VISIT: HCPCS | Performed by: INTERNAL MEDICINE

## 2023-03-10 NOTE — PATIENT INSTRUCTIONS
A Healthy Heart: Care Instructions  Your Care Instructions     Coronary artery disease, also called heart disease, occurs when a substance called plaque builds up in the vessels that supply oxygen-rich blood to your heart muscle. This can narrow the blood vessels and reduce blood flow. A heart attack happens when blood flow is completely blocked. A high-fat diet, smoking, and other factors increase the risk of heart disease. Your doctor has found that you have a chance of having heart disease. You can do lots of things to keep your heart healthy. It may not be easy, but you can change your diet, exercise more, and quit smoking. These steps really work to lower your chance of heart disease. Follow-up care is a key part of your treatment and safety. Be sure to make and go to all appointments, and call your doctor if you are having problems. It's also a good idea to know your test results and keep a list of the medicines you take. How can you care for yourself at home? Diet    Use less salt when you cook and eat. This helps lower your blood pressure. Taste food before salting. Add only a little salt when you think you need it. With time, your taste buds will adjust to less salt.     Eat fewer snack items, fast foods, canned soups, and other high-salt, high-fat, processed foods.     Read food labels and try to avoid saturated and trans fats. They increase your risk of heart disease by raising cholesterol levels.     Limit the amount of solid fat-butter, margarine, and shortening-you eat. Use olive, peanut, or canola oil when you cook. Bake, broil, and steam foods instead of frying them.     Eat a variety of fruit and vegetables every day. Dark green, deep orange, red, or yellow fruits and vegetables are especially good for you. Examples include spinach, carrots, peaches, and berries.     Foods high in fiber can reduce your cholesterol and provide important vitamins and minerals.  High-fiber foods include whole-grain cereals and breads, oatmeal, beans, brown rice, citrus fruits, and apples.     Eat lean proteins. Heart-healthy proteins include seafood, lean meats and poultry, eggs, beans, peas, nuts, seeds, and soy products.     Limit drinks and foods with added sugar. These include candy, desserts, and soda pop. Lifestyle changes    If your doctor recommends it, get more exercise. Walking is a good choice. Bit by bit, increase the amount you walk every day. Try for at least 30 minutes on most days of the week. You also may want to swim, bike, or do other activities.     Do not smoke. If you need help quitting, talk to your doctor about stop-smoking programs and medicines. These can increase your chances of quitting for good. Quitting smoking may be the most important step you can take to protect your heart. It is never too late to quit.     Limit alcohol to 2 drinks a day for men and 1 drink a day for women. Too much alcohol can cause health problems.     Manage other health problems such as diabetes, high blood pressure, and high cholesterol. If you think you may have a problem with alcohol or drug use, talk to your doctor. Medicines    Take your medicines exactly as prescribed. Call your doctor if you think you are having a problem with your medicine.     If your doctor recommends aspirin, take the amount directed each day. Make sure you take aspirin and not another kind of pain reliever, such as acetaminophen (Tylenol). When should you call for help? Call 911 if you have symptoms of a heart attack. These may include:    Chest pain or pressure, or a strange feeling in the chest.     Sweating.     Shortness of breath.     Pain, pressure, or a strange feeling in the back, neck, jaw, or upper belly or in one or both shoulders or arms.     Lightheadedness or sudden weakness.     A fast or irregular heartbeat. After you call 911, the  may tell you to chew 1 adult-strength or 2 to 4 low-dose aspirin. Wait for an ambulance. Do not try to drive yourself. Watch closely for changes in your health, and be sure to contact your doctor if you have any problems. Where can you learn more? Go to http://www.tran.com/ and enter F075 to learn more about \"A Healthy Heart: Care Instructions. \"  Current as of: September 7, 2022               Content Version: 13.5  © 2006-2022 [employer]. Care instructions adapted under license by [facility] ([facility]). If you have questions about a medical condition or this instruction, always ask your healthcare professional. [place] [address] any warranty or liability for your use of this information. Personalized Preventive Plan for Sagar Jarvis - 2/7/2023  Medicare offers a range of preventive health benefits. Some of the tests and screenings are paid in full while other may be subject to a deductible, co-insurance, and/or copay. Some of these benefits include a comprehensive review of your medical history including lifestyle, illnesses that may run in your family, and various assessments and screenings as appropriate. After reviewing your medical record and screening and assessments performed today your provider may have ordered immunizations, labs, imaging, and/or referrals for you. A list of these orders (if applicable) as well as your Preventive Care list are included within your After Visit Summary for your review. Other Preventive Recommendations:    A preventive eye exam performed by an eye specialist is recommended every 1-2 years to screen for glaucoma; cataracts, macular degeneration, and other eye disorders. A preventive dental visit is recommended every 6 months. Try to get at least 150 minutes of exercise per week or 10,000 steps per day on a pedometer . Order or download the FREE \"Exercise & Physical Activity: Your Everyday Guide\" from [employer].  Call 2-570.144.8735 or search [facility] [place] on A[facility] online.  You need 3900-4761 mg of calcium and 3930-9778 IU of vitamin D per day. It is possible to meet your calcium requirement with diet alone, but a vitamin D supplement is usually necessary to meet this goal.  When exposed to the sun, use a sunscreen that protects against both UVA and UVB radiation with an SPF of 30 or greater. Reapply every 2 to 3 hours or after sweating, drying off with a towel, or swimming.  Always wear a seat belt when traveling in a car. Always wear a helmet when riding a bicycle or motorcycle.

## 2023-03-10 NOTE — PROGRESS NOTES
Medicare Annual Wellness Visit    Amol Muñiz is here for No chief complaint on file. Assessment & Plan   Initial Medicare annual wellness visit      Recommendations for Preventive Services Due: see orders and patient instructions/AVS.  Recommended screening schedule for the next 5-10 years is provided to the patient in written form: see Patient Instructions/AVS.     Return for Medicare Annual Wellness Visit in 1 year. Subjective       Patient's complete Health Risk Assessment and screening values have been reviewed and are found in Flowsheets. The following problems were reviewed today and where indicated follow up appointments were made and/or referrals ordered. Positive Risk Factor Screenings with Interventions:                                       Objective   There were no vitals filed for this visit. There is no height or weight on file to calculate BMI. Allergies   Allergen Reactions    Sulfa Antibiotics Rash     Prior to Visit Medications    Medication Sig Taking?  Authorizing Provider   fludrocortisone (FLORINEF) 0.1 MG tablet Take 0.1 mg by mouth daily  Historical Provider, MD   ketorolac (TORADOL) 10 MG tablet Take 1 tablet by mouth every 6 hours as needed for Pain  Irma Bence, MD       [facility] (Including outside providers/suppliers regularly involved in providing care):   Patient Care Team:  Blaine Morataya MD as PCP - General (Geriatric Medicine)     Reviewed and updated this visit:              Mecca Dexter MD

## 2023-03-17 PROBLEM — N32.9 DISORDER OF BLADDER: Status: ACTIVE | Noted: 2023-03-17

## 2023-03-17 PROBLEM — R05.9 COUGH, UNSPECIFIED: Status: ACTIVE | Noted: 2022-06-22

## 2023-03-17 PROBLEM — Z96.1 PSEUDOPHAKIA OF BOTH EYES: Status: ACTIVE | Noted: 2018-08-01

## 2023-03-17 PROBLEM — M54.50 LOW BACK PAIN, UNSPECIFIED: Status: ACTIVE | Noted: 2022-06-22

## 2023-03-17 PROBLEM — G20 PARKINSON'S DISEASE (HCC): Status: ACTIVE | Noted: 2022-06-29

## 2023-03-17 PROBLEM — R27.0 ATAXIA: Status: ACTIVE | Noted: 2023-03-17

## 2023-03-17 PROBLEM — R29.898 OTHER SYMPTOMS AND SIGNS INVOLVING THE MUSCULOSKELETAL SYSTEM: Status: ACTIVE | Noted: 2021-11-23

## 2023-03-17 PROBLEM — E87.0 HYPERNATREMIA: Status: ACTIVE | Noted: 2023-03-17

## 2023-03-17 PROBLEM — Z20.822 CONTACT WITH AND (SUSPECTED) EXPOSURE TO COVID-19: Status: ACTIVE | Noted: 2022-06-22

## 2023-03-17 PROBLEM — N39.0 URINARY TRACT INFECTION: Status: ACTIVE | Noted: 2023-03-17

## 2023-03-17 PROBLEM — S32.19XA OTHER FRACTURE OF SACRUM, INITIAL ENCOUNTER FOR CLOSED FRACTURE (HCC): Status: ACTIVE | Noted: 2022-06-29

## 2023-03-17 PROBLEM — M46.1 INFLAMMATION OF RIGHT SACROILIAC JOINT (HCC): Status: ACTIVE | Noted: 2018-06-07

## 2023-03-17 PROBLEM — M60.10 INTERSTITIAL MYOSITIS: Status: ACTIVE | Noted: 2023-03-17

## 2023-03-17 PROBLEM — I73.00 RAYNAUD'S SYNDROME WITHOUT GANGRENE: Status: ACTIVE | Noted: 2022-06-22

## 2023-03-17 PROBLEM — E55.9 VITAMIN D DEFICIENCY: Status: ACTIVE | Noted: 2023-03-17
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
## 2023-03-17 PROBLEM — E67.3 HYPERVITAMINOSIS D: Status: ACTIVE | Noted: 2023-03-17

## 2023-03-17 PROBLEM — R53.83 MALAISE AND FATIGUE: Status: ACTIVE | Noted: 2018-06-07

## 2023-03-17 PROBLEM — D31.32 CHOROIDAL NEVUS OF LEFT EYE: Status: ACTIVE | Noted: 2018-08-01

## 2023-03-17 PROBLEM — E16.2 HYPOGLYCEMIA: Status: ACTIVE | Noted: 2023-03-17

## 2023-03-17 PROBLEM — H04.123 DRY EYE SYNDROME, BILATERAL: Status: ACTIVE | Noted: 2018-08-01

## 2023-03-17 PROBLEM — G20.A1 PARKINSON'S DISEASE: Status: ACTIVE | Noted: 2022-06-29

## 2023-03-17 PROBLEM — K63.9 DISORDER OF INTESTINE: Status: ACTIVE | Noted: 2023-03-17

## 2023-03-17 PROBLEM — R32 URINARY INCONTINENCE: Status: ACTIVE | Noted: 2022-06-22

## 2023-03-17 PROBLEM — M81.0 AGE-RELATED OSTEOPOROSIS WITHOUT CURRENT PATHOLOGICAL FRACTURE: Status: ACTIVE | Noted: 2022-06-22

## 2023-03-17 PROBLEM — R03.0 ELEVATED BLOOD PRESSURE READING WITHOUT DIAGNOSIS OF HYPERTENSION: Status: ACTIVE | Noted: 2023-03-17

## 2023-03-17 PROBLEM — M62.81 MUSCLE WEAKNESS OF EXTREMITY: Status: ACTIVE | Noted: 2023-03-17

## 2023-03-17 PROBLEM — R53.1 WEAKNESS: Status: ACTIVE | Noted: 2023-03-17

## 2023-03-17 PROBLEM — G95.9 DISEASE OF SPINAL CORD (HCC): Status: ACTIVE | Noted: 2021-11-23

## 2023-03-17 PROBLEM — R26.9 ABNORMALITY OF GAIT: Status: ACTIVE | Noted: 2018-06-07

## 2023-03-17 PROBLEM — M84.48XA PATHOLOGICAL FRACTURE, OTHER SITE, INITIAL ENCOUNTER FOR FRACTURE: Status: ACTIVE | Noted: 2022-06-29

## 2023-03-17 PROBLEM — R73.9 HYPERGLYCEMIA: Status: ACTIVE | Noted: 2023-03-17

## 2023-03-17 PROBLEM — F12.90 MARIJUANA USER: Status: ACTIVE | Noted: 2023-03-17

## 2023-03-17 PROBLEM — D72.819 LEUKOPENIA: Status: ACTIVE | Noted: 2023-03-17

## 2023-03-17 PROBLEM — M54.2 NECK PAIN: Status: ACTIVE | Noted: 2023-03-17

## 2023-03-17 PROBLEM — F43.21 GRIEF: Status: ACTIVE | Noted: 2023-03-17

## 2023-03-17 PROBLEM — G89.18 ACUTE POST-OPERATIVE PAIN: Status: ACTIVE | Noted: 2019-02-19

## 2023-03-17 PROBLEM — R31.29 MICROSCOPIC HEMATURIA: Status: ACTIVE | Noted: 2023-03-17

## 2023-03-17 PROBLEM — G62.9 POLYNEUROPATHY, UNSPECIFIED: Status: ACTIVE | Noted: 2022-06-22

## 2023-03-17 PROBLEM — E78.5 HYPERLIPIDEMIA: Status: ACTIVE | Noted: 2023-03-17

## 2023-03-17 PROBLEM — R20.2 PARESTHESIA: Status: ACTIVE | Noted: 2022-06-22

## 2023-03-17 PROBLEM — R53.81 MALAISE AND FATIGUE: Status: ACTIVE | Noted: 2018-06-07

## 2023-03-17 PROBLEM — R26.89 OTHER ABNORMALITIES OF GAIT AND MOBILITY: Status: ACTIVE | Noted: 2022-06-22

## 2023-03-17 PROBLEM — L82.1 SEBORRHEIC KERATOSIS: Status: ACTIVE | Noted: 2023-03-17

## 2023-03-27 ENCOUNTER — PATIENT MESSAGE (OUTPATIENT)
Dept: NEUROLOGY | Age: 73
End: 2023-03-27

## 2023-03-27 ENCOUNTER — OFFICE VISIT (OUTPATIENT)
Dept: NEUROLOGY | Age: 73
End: 2023-03-27

## 2023-03-27 VITALS
HEART RATE: 88 BPM | WEIGHT: 121 LBS | BODY MASS INDEX: 19.53 KG/M2 | SYSTOLIC BLOOD PRESSURE: 122 MMHG | DIASTOLIC BLOOD PRESSURE: 68 MMHG

## 2023-03-27 DIAGNOSIS — G62.9 POLYNEUROPATHY, UNSPECIFIED: ICD-10-CM

## 2023-03-27 DIAGNOSIS — G95.9 DISEASE OF SPINAL CORD (HCC): ICD-10-CM

## 2023-03-27 DIAGNOSIS — M48.02 CERVICAL SPINAL STENOSIS: ICD-10-CM

## 2023-03-27 DIAGNOSIS — R53.82 CHRONIC FATIGUE, UNSPECIFIED: ICD-10-CM

## 2023-03-27 DIAGNOSIS — G35 MS (MULTIPLE SCLEROSIS) (HCC): Primary | ICD-10-CM

## 2023-03-27 DIAGNOSIS — R26.9 ABNORMALITY OF GAIT: ICD-10-CM

## 2023-03-27 DIAGNOSIS — G89.4 CHRONIC PAIN SYNDROME: ICD-10-CM

## 2023-03-27 ASSESSMENT — ENCOUNTER SYMPTOMS
TROUBLE SWALLOWING: 0
COLOR CHANGE: 0
BACK PAIN: 1
PHOTOPHOBIA: 0
CHOKING: 0
VOMITING: 0
SHORTNESS OF BREATH: 0
NAUSEA: 0

## 2023-03-27 NOTE — PROGRESS NOTES
Ster  Subjective:      Patient ID: Kin Maldonado is a 71 y.o. female who presents today for:  Chief Complaint   Patient presents with    New Patient     Pt states that she was DX with MS in 1986 but has had it since she was 13. She states that her last neurologist states that it is not active in 15 years. Pt states that she is losing mobility rapidly,  L5 never is always inflamed and giving her problems. She states that she is losing strength in the lower extremities. 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 Pt states that last MRI was done over a year ago through [address] [place] [address]. Pt states that she on IVIG. Other     Novotrimol and also copaxson for about 2 years. Pt states that this is the first time she has had to go to  In wheelchair as well. Pt states that she is walking with walker and it is very difficult when she does. HPI 61-year-old right-handed female with known history of multiple sclerosis who is referred for evaluation of multiple sclerosis and she is not on any medications. She was diagnosed many years ago [address] 17 when she was in a heated pool and continue to have lower extremity symptoms. She was then evaluated by Multiple neurologist in [place]. At the age of 29 she was started on Novantrone. She received 3 infusions had significant side effects and were concerned about cardiomyopathy and this was discontinued. She then had IVIG and Copaxone and the last treatment was rituximab she had significant side effects of the same. She has not been on any other medication and continues to show slow decline. 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 She walks with a walker and some days she can walk a few feet.   In the interim she was diagnosed with cervical spinal stenosis had a cervical spinal laminectomy

## 2023-03-28 ENCOUNTER — TELEPHONE (OUTPATIENT)
Dept: NEUROLOGY | Age: 73
End: 2023-03-28

## 2023-03-28 NOTE — TELEPHONE ENCOUNTER
Chuck message about pain:     Dr. Fadumo De. thank you for a very informative visit today. After processing my visit, I didn't think I emphasized my pain level very well. I live with a level 3-4 pain around my L5 nerve which my former neurologist  did an EMG on. It is always hot and the radiating pain runs down the outside of my right leg down to the toes. I know I also have arthritis around my pelvis and  probably my right  SI joint as well. This pain also affects my walking and sitting endurance. If I did not use medical marijuana I would not be able to fall asleep with so much discomfort and over whelming sensation. I am not fond of pain meds and do not tolerate them well (like gabapentin)  soI am hoping the MRI might also be able to identify  what's happening in that area. I also fell and fractured (hairline ) the left side of my sacrum last July which exacerbated a head to toe  inflammatory response for several weeks. Is the CNS issue what also triggered the rather newer peripheral neuropathy  from my waist down through my hips as well? Thanks very much for adding the pain into my  concerns. Alice Ferrara    Please advise if anything needs to be done.

## 2023-03-29 LAB
ALBUMIN SERPL-MCNC: 4 G/DL (ref 3.5–4.6)
ALP SERPL-CCNC: 72 U/L (ref 40–130)
ALT SERPL-CCNC: 20 U/L (ref 0–33)
AST SERPL-CCNC: 19 U/L (ref 0–35)
BASOPHILS # BLD: 0 K/UL (ref 0–0.2)
BASOPHILS NFR BLD: 0.4 %
BILIRUB DIRECT SERPL-MCNC: <0.2 MG/DL (ref 0–0.4)
BILIRUB INDIRECT SERPL-MCNC: NORMAL MG/DL (ref 0–0.6)
BILIRUB SERPL-MCNC: 0.3 MG/DL (ref 0.2–0.7)
EOSINOPHIL # BLD: 0 K/UL (ref 0–0.7)
EOSINOPHIL NFR BLD: 0.2 %
ERYTHROCYTE [DISTWIDTH] IN BLOOD BY AUTOMATED COUNT: 13.4 % (ref 11.5–14.5)
HCT VFR BLD AUTO: 40 % (ref 37–47)
HGB BLD-MCNC: 13.6 G/DL (ref 12–16)
LYMPHOCYTES # BLD: 0.8 K/UL (ref 1–4.8)
LYMPHOCYTES NFR BLD: 12.5 %
MCH RBC QN AUTO: 32.5 PG (ref 27–31.3)
MCHC RBC AUTO-ENTMCNC: 33.9 % (ref 33–37)
MCV RBC AUTO: 95.9 FL (ref 79.4–94.8)
MONOCYTES # BLD: 0.6 K/UL (ref 0.2–0.8)
MONOCYTES NFR BLD: 10.2 %
NEUTROPHILS # BLD: 4.6 K/UL (ref 1.4–6.5)
NEUTS SEG NFR BLD: 76.7 %
PLATELET # BLD AUTO: 152 K/UL (ref 130–400)
PROT SERPL-MCNC: 6.5 G/DL (ref 6.3–8)
RBC # BLD AUTO: 4.17 M/UL (ref 4.2–5.4)
WBC # BLD AUTO: 6.1 K/UL (ref 4.8–10.8)

## 2023-03-31 LAB
VZV IGG SER IA-ACNC: 1250 IV
VZV IGM SER IA-ACNC: 0.06 ISR

## 2023-04-01 LAB
REASON FOR REJECTION: NORMAL
REJECTED TEST: NORMAL

## 2023-04-08 LAB
QUANTI TB GOLD PLUS: NEGATIVE
QUANTI TB1 MINUS NIL: 0 IU/ML (ref 0–0.34)
QUANTI TB2 MINUS NIL: 0 IU/ML (ref 0–0.34)
QUANTIFERON MITOGEN: >10 IU/ML
QUANTIFERON NIL: 0.05 IU/ML

## 2023-04-16 PROBLEM — N39.0 URINARY TRACT INFECTION: Status: RESOLVED | Noted: 2023-03-17 | Resolved: 2023-04-16

## 2023-04-18 ENCOUNTER — TELEPHONE (OUTPATIENT)
Dept: NEUROLOGY | Age: 73
End: 2023-04-18

## 2023-04-18 DIAGNOSIS — G35 MS (MULTIPLE SCLEROSIS) (HCC): Primary | ICD-10-CM

## 2023-04-19 LAB
ANION GAP SERPL CALCULATED.3IONS-SCNC: 12 MEQ/L (ref 9–15)
BUN SERPL-MCNC: 13 MG/DL (ref 8–23)
CALCIUM SERPL-MCNC: 9.7 MG/DL (ref 8.5–9.9)
CHLORIDE SERPL-SCNC: 104 MEQ/L (ref 95–107)
CO2 SERPL-SCNC: 28 MEQ/L (ref 20–31)
CREAT SERPL-MCNC: 0.6 MG/DL (ref 0.5–0.9)
GLUCOSE SERPL-MCNC: 108 MG/DL (ref 70–99)
POTASSIUM SERPL-SCNC: 4.1 MEQ/L (ref 3.4–4.9)
SODIUM SERPL-SCNC: 144 MEQ/L (ref 135–144)

## 2023-04-23 ENCOUNTER — HOSPITAL ENCOUNTER (OUTPATIENT)
Dept: MRI IMAGING | Age: 73
Discharge: HOME | End: 2023-04-25
Payer: MEDICARE

## 2023-04-23 DIAGNOSIS — G35 MS (MULTIPLE SCLEROSIS) (HCC): ICD-10-CM

## 2023-04-23 PROCEDURE — 72148 MRI LUMBAR SPINE W/O DYE: CPT

## 2023-04-23 PROCEDURE — 6360000004 HC RX CONTRAST MEDICATION: Performed by: PSYCHIATRY & NEUROLOGY

## 2023-04-23 PROCEDURE — A9579 GAD-BASE MR CONTRAST NOS,1ML: HCPCS | Performed by: PSYCHIATRY & NEUROLOGY

## 2023-04-23 PROCEDURE — 72156 MRI NECK SPINE W/O & W/DYE: CPT

## 2023-04-23 RX ADMIN — GADOTERIDOL 15 ML: 279.3 INJECTION, SOLUTION INTRAVENOUS at 11:55

## 2023-04-30 ENCOUNTER — HOSPITAL ENCOUNTER (OUTPATIENT)
Dept: MRI IMAGING | Age: 73
Discharge: HOME | End: 2023-05-02
Payer: MEDICARE

## 2023-04-30 DIAGNOSIS — G35 MS (MULTIPLE SCLEROSIS) (HCC): ICD-10-CM

## 2023-04-30 PROCEDURE — 6360000004 HC RX CONTRAST MEDICATION: Performed by: PSYCHIATRY & NEUROLOGY

## 2023-04-30 PROCEDURE — 70553 MRI BRAIN STEM W/O & W/DYE: CPT

## 2023-04-30 PROCEDURE — A9579 GAD-BASE MR CONTRAST NOS,1ML: HCPCS | Performed by: PSYCHIATRY & NEUROLOGY

## 2023-04-30 RX ADMIN — GADOTERIDOL 10 ML: 279.3 INJECTION, SOLUTION INTRAVENOUS at 10:48

## 2023-05-09 ENCOUNTER — PATIENT MESSAGE (OUTPATIENT)
Dept: NEUROLOGY | Age: 73
End: 2023-05-09

## 2023-05-10 ENCOUNTER — TELEPHONE (OUTPATIENT)
Dept: NEUROLOGY | Age: 73
End: 2023-05-10

## 2023-06-28 PROBLEM — R53.82 CHRONIC FATIGUE: Status: ACTIVE | Noted: 2023-06-28

## 2023-07-03 ENCOUNTER — OFFICE VISIT (OUTPATIENT)
Dept: NEUROLOGY | Age: 73
End: 2023-07-03
Payer: MEDICARE

## 2023-07-03 VITALS
BODY MASS INDEX: 20.01 KG/M2 | DIASTOLIC BLOOD PRESSURE: 72 MMHG | SYSTOLIC BLOOD PRESSURE: 120 MMHG | WEIGHT: 124 LBS | HEART RATE: 64 BPM

## 2023-07-03 DIAGNOSIS — G35 MS (MULTIPLE SCLEROSIS) (HCC): ICD-10-CM

## 2023-07-03 DIAGNOSIS — R27.0 ATAXIA: ICD-10-CM

## 2023-07-03 DIAGNOSIS — G35 MS (MULTIPLE SCLEROSIS) (HCC): Primary | ICD-10-CM

## 2023-07-03 DIAGNOSIS — R25.1 TREMOR: ICD-10-CM

## 2023-07-03 DIAGNOSIS — G93.31 POSTVIRAL FATIGUE SYNDROME: ICD-10-CM

## 2023-07-03 LAB
ALBUMIN SERPL-MCNC: 4.2 G/DL (ref 3.5–4.6)
ALP SERPL-CCNC: 91 U/L (ref 40–130)
ALT SERPL-CCNC: 14 U/L (ref 0–33)
AST SERPL-CCNC: 15 U/L (ref 0–35)
BASOPHILS # BLD: 0 K/UL (ref 0–0.2)
BASOPHILS NFR BLD: 0.4 %
BILIRUB DIRECT SERPL-MCNC: <0.2 MG/DL (ref 0–0.4)
BILIRUB INDIRECT SERPL-MCNC: NORMAL MG/DL (ref 0–0.6)
BILIRUB SERPL-MCNC: 0.3 MG/DL (ref 0.2–0.7)
EOSINOPHIL # BLD: 0.1 K/UL (ref 0–0.7)
EOSINOPHIL NFR BLD: 1.5 %
ERYTHROCYTE [DISTWIDTH] IN BLOOD BY AUTOMATED COUNT: 12.8 % (ref 11.5–14.5)
HCT VFR BLD AUTO: 42.1 % (ref 37–47)
HGB BLD-MCNC: 14 G/DL (ref 12–16)
LYMPHOCYTES # BLD: 1.4 K/UL (ref 1–4.8)
LYMPHOCYTES NFR BLD: 32.5 %
MCH RBC QN AUTO: 31.5 PG (ref 27–31.3)
MCHC RBC AUTO-ENTMCNC: 33.3 % (ref 33–37)
MCV RBC AUTO: 94.5 FL (ref 79.4–94.8)
MONOCYTES # BLD: 0.4 K/UL (ref 0.2–0.8)
MONOCYTES NFR BLD: 8.3 %
NEUTROPHILS # BLD: 2.5 K/UL (ref 1.4–6.5)
NEUTS SEG NFR BLD: 57.3 %
PLATELET # BLD AUTO: 250 K/UL (ref 130–400)
PROT SERPL-MCNC: 6.4 G/DL (ref 6.3–8)
RBC # BLD AUTO: 4.46 M/UL (ref 4.2–5.4)
WBC # BLD AUTO: 4.3 K/UL (ref 4.8–10.8)

## 2023-07-03 PROCEDURE — 3017F COLORECTAL CA SCREEN DOC REV: CPT | Performed by: PSYCHIATRY & NEUROLOGY

## 2023-07-03 PROCEDURE — 99214 OFFICE O/P EST MOD 30 MIN: CPT | Performed by: PSYCHIATRY & NEUROLOGY

## 2023-07-03 PROCEDURE — G8427 DOCREV CUR MEDS BY ELIG CLIN: HCPCS | Performed by: PSYCHIATRY & NEUROLOGY

## 2023-07-03 PROCEDURE — 1090F PRES/ABSN URINE INCON ASSESS: CPT | Performed by: PSYCHIATRY & NEUROLOGY

## 2023-07-03 PROCEDURE — 1036F TOBACCO NON-USER: CPT | Performed by: PSYCHIATRY & NEUROLOGY

## 2023-07-03 PROCEDURE — G8420 CALC BMI NORM PARAMETERS: HCPCS | Performed by: PSYCHIATRY & NEUROLOGY

## 2023-07-03 PROCEDURE — G8399 PT W/DXA RESULTS DOCUMENT: HCPCS | Performed by: PSYCHIATRY & NEUROLOGY

## 2023-07-03 PROCEDURE — 1123F ACP DISCUSS/DSCN MKR DOCD: CPT | Performed by: PSYCHIATRY & NEUROLOGY

## 2023-07-03 RX ORDER — GABAPENTIN 100 MG/1
CAPSULE ORAL
COMMUNITY
Start: 2023-06-06

## 2023-07-03 RX ORDER — CHOLECALCIFEROL (VITAMIN D3) 1250 MCG
CAPSULE ORAL
COMMUNITY
Start: 2023-06-06

## 2023-07-03 ASSESSMENT — ENCOUNTER SYMPTOMS
SHORTNESS OF BREATH: 0
CHOKING: 0
TROUBLE SWALLOWING: 0
BACK PAIN: 0
COLOR CHANGE: 0
NAUSEA: 0
VOMITING: 0
PHOTOPHOBIA: 0

## 2023-07-03 NOTE — PROGRESS NOTES
Subjective:      Patient ID: Otis Fleming is a 71 y.o. female who presents today for:  Chief Complaint   Patient presents with    Follow-up     Pt states since her last visit she feels a little stronger. Pt states she has noticed some more pain and is due to her L5 and how she positions her self when she becomes weaker. Pt would like to discuss infusions today. HPI 81-year-old right-handed female with a history of gnosis who I seen and has not been on any medication. She was diagnosed many years ago at the age of 29 she was on Novantrone. She received 3 infusions with significant side effects as expected. She has been on IVIG and Copaxone. EDSS scores are 7-8 she walks with a walker electric scooter as well. The patient is here to discuss further options and treatment with a follow-up MRI evaluation. MRI is reviewed and patient is extensive cerebral and spinal white matter changes. He is here to discuss Ocrevus. Past Medical History:   Diagnosis Date    Chronic pain syndrome     Multiple sclerosis (HCC)     Orthostatic hypotension     Osteoporosis 05/29/2018    age-related w/o current pathological fracture    Other lack of coordination     Spinal stenosis, cervical region     Unspecified retinal detachment with retinal break, left eye      No past surgical history on file. Social History     Socioeconomic History    Marital status:       Spouse name: Not on file    Number of children: Not on file    Years of education: Not on file    Highest education level: Not on file   Occupational History    Not on file   Tobacco Use    Smoking status: Never    Smokeless tobacco: Never   Substance and Sexual Activity    Alcohol use: Never    Drug use: Yes     Types: Marijuana Justa Swift)     Comment: pt has medical marijuana card    Sexual activity: Not on file   Other Topics Concern    Not on file   Social History Narrative    Not on file     Social Determinants of Health     Financial Resource Strain:

## 2023-07-04 LAB
HAV IGM SER IA-ACNC: NONREACTIVE
HEPATITIS B CORE IGM ANTIBODY: NONREACTIVE
HEPATITIS B SURF AG,XHBAGS: NONREACTIVE
HEPATITIS C ANTIBODY: NONREACTIVE

## 2023-07-06 LAB — VZV IGM SER IA-ACNC: 0.1 ISR

## 2023-07-07 LAB — VZV IGG SER IA-ACNC: 1099 IV

## 2023-07-10 LAB
REASON FOR REJECTION: NORMAL
REJECTED TEST: NORMAL

## 2023-07-14 LAB
QUANTI TB GOLD PLUS: NEGATIVE
QUANTI TB1 MINUS NIL: 0.01 IU/ML (ref 0–0.34)
QUANTI TB2 MINUS NIL: 0.01 IU/ML (ref 0–0.34)
QUANTIFERON MITOGEN: 9.1 IU/ML
QUANTIFERON NIL: 0.02 IU/ML

## 2023-07-23 RX ORDER — GABAPENTIN 100 MG/1
100 CAPSULE ORAL 2 TIMES DAILY
Qty: 60 CAPSULE | Refills: 0 | Status: SHIPPED | OUTPATIENT
Start: 2023-07-23 | End: 2023-08-22

## 2023-08-01 ENCOUNTER — TELEPHONE (OUTPATIENT)
Dept: NEUROLOGY | Age: 73
End: 2023-08-01

## 2023-08-02 DIAGNOSIS — G35 MS (MULTIPLE SCLEROSIS) (HCC): Primary | ICD-10-CM

## 2023-08-02 RX ORDER — OFATUMUMAB 20 MG/.4ML
20 INJECTION, SOLUTION SUBCUTANEOUS
Qty: 1 ADJUSTABLE DOSE PRE-FILLED PEN SYRINGE | Refills: 5 | Status: SHIPPED | OUTPATIENT
Start: 2023-08-02 | End: 2023-09-01

## 2023-08-02 RX ORDER — OFATUMUMAB 20 MG/.4ML
INJECTION, SOLUTION SUBCUTANEOUS
Qty: 3 ADJUSTABLE DOSE PRE-FILLED PEN SYRINGE | Refills: 0 | Status: SHIPPED | OUTPATIENT
Start: 2023-08-02

## 2023-08-30 DIAGNOSIS — G35 MS (MULTIPLE SCLEROSIS) (HCC): ICD-10-CM

## 2023-08-30 NOTE — TELEPHONE ENCOUNTER
Please print in [id number] [facility] Katrin to go to [employer] patient assistance     Thank you

## 2023-08-31 DIAGNOSIS — G35 MS (MULTIPLE SCLEROSIS) (HCC): ICD-10-CM

## 2023-08-31 RX ORDER — OFATUMUMAB 20 MG/.4ML
20 INJECTION, SOLUTION SUBCUTANEOUS
Qty: 1 ADJUSTABLE DOSE PRE-FILLED PEN SYRINGE | Refills: 5 | Status: ACTIVE | OUTPATIENT
Start: 2023-08-31 | End: 2023-09-01 | Stop reason: SDUPTHER

## 2023-08-31 RX ORDER — OFATUMUMAB 20 MG/.4ML
20 INJECTION, SOLUTION SUBCUTANEOUS
Qty: 1 ADJUSTABLE DOSE PRE-FILLED PEN SYRINGE | Refills: 5 | Status: ACTIVE | OUTPATIENT
Start: 2023-08-31 | End: 2023-08-31 | Stop reason: SDUPTHER

## 2023-09-01 RX ORDER — OFATUMUMAB 20 MG/.4ML
20 INJECTION, SOLUTION SUBCUTANEOUS
Qty: 1 ADJUSTABLE DOSE PRE-FILLED PEN SYRINGE | Refills: 5 | Status: SHIPPED | OUTPATIENT
Start: 2023-09-01 | End: 2023-10-01

## 2023-09-08 LAB
ALBUMIN SERPL-MCNC: 4.1 G/DL (ref 3.5–4.6)
ALP SERPL-CCNC: 75 U/L (ref 40–130)
ALT SERPL-CCNC: 12 U/L (ref 0–33)
ANION GAP SERPL CALCULATED.3IONS-SCNC: 9 MEQ/L (ref 9–15)
AST SERPL-CCNC: 14 U/L (ref 0–35)
BILIRUB SERPL-MCNC: 0.4 MG/DL (ref 0.2–0.7)
BUN SERPL-MCNC: 13 MG/DL (ref 8–23)
CALCIUM SERPL-MCNC: 9.3 MG/DL (ref 8.5–9.9)
CHLORIDE SERPL-SCNC: 105 MEQ/L (ref 95–107)
CHOLEST SERPL-MCNC: 215 MG/DL (ref 0–199)
CO2 SERPL-SCNC: 28 MEQ/L (ref 20–31)
CREAT SERPL-MCNC: 0.49 MG/DL (ref 0.5–0.9)
ERYTHROCYTE [DISTWIDTH] IN BLOOD BY AUTOMATED COUNT: 13.1 % (ref 11.5–14.5)
GLOBULIN SER CALC-MCNC: 2.6 G/DL (ref 2.3–3.5)
GLUCOSE SERPL-MCNC: 83 MG/DL (ref 70–99)
HCT VFR BLD AUTO: 44 % (ref 37–47)
HDLC SERPL-MCNC: 63 MG/DL (ref 40–59)
HGB BLD-MCNC: 14.5 G/DL (ref 12–16)
LDLC SERPL CALC-MCNC: 119 MG/DL (ref 0–129)
MCH RBC QN AUTO: 30.8 PG (ref 27–31.3)
MCHC RBC AUTO-ENTMCNC: 33 % (ref 33–37)
MCV RBC AUTO: 93.4 FL (ref 79.4–94.8)
PLATELET # BLD AUTO: 221 K/UL (ref 130–400)
POTASSIUM SERPL-SCNC: 3.9 MEQ/L (ref 3.4–4.9)
PROT SERPL-MCNC: 6.7 G/DL (ref 6.3–8)
RBC # BLD AUTO: 4.71 M/UL (ref 4.2–5.4)
SODIUM SERPL-SCNC: 142 MEQ/L (ref 135–144)
TRIGL SERPL-MCNC: 166 MG/DL (ref 0–150)
WBC # BLD AUTO: 5.1 K/UL (ref 4.8–10.8)

## 2023-09-15 ENCOUNTER — TELEPHONE (OUTPATIENT)
Dept: NEUROLOGY | Age: 73
End: 2023-09-15

## 2023-09-15 NOTE — TELEPHONE ENCOUNTER
Khurram Ruiz was approved, Copay is $4289.74, and there is no open assistance. Applied her for [employer] patient assistance [facility] and they denied her assistance stating that her household income exceeds program limits     Anything name brand will probably be to costly.       Please advise

## 2023-10-05 ENCOUNTER — OFFICE VISIT (OUTPATIENT)
Dept: GERIATRIC MEDICINE | Age: 73
End: 2023-10-05
Payer: MEDICARE

## 2023-10-05 DIAGNOSIS — R63.4 WEIGHT LOSS: ICD-10-CM

## 2023-10-05 DIAGNOSIS — M46.1 INFLAMMATION OF RIGHT SACROILIAC JOINT (HCC): ICD-10-CM

## 2023-10-05 DIAGNOSIS — R25.1 TREMOR: ICD-10-CM

## 2023-10-05 DIAGNOSIS — G35 MS (MULTIPLE SCLEROSIS) (HCC): Primary | ICD-10-CM

## 2023-10-05 PROCEDURE — G8484 FLU IMMUNIZE NO ADMIN: HCPCS | Performed by: INTERNAL MEDICINE

## 2023-10-05 PROCEDURE — G8420 CALC BMI NORM PARAMETERS: HCPCS | Performed by: INTERNAL MEDICINE

## 2023-10-05 PROCEDURE — 99349 HOME/RES VST EST MOD MDM 40: CPT | Performed by: INTERNAL MEDICINE

## 2023-10-05 PROCEDURE — 1090F PRES/ABSN URINE INCON ASSESS: CPT | Performed by: INTERNAL MEDICINE

## 2023-10-05 PROCEDURE — 1123F ACP DISCUSS/DSCN MKR DOCD: CPT | Performed by: INTERNAL MEDICINE

## 2023-10-05 NOTE — TELEPHONE ENCOUNTER
Patient does not feel that she can physically do a infusion , sent a reconsideration letter to [employer] to see if they can over turn the denial for assistance

## 2023-10-10 PROBLEM — G93.31 POSTVIRAL FATIGUE SYNDROME: Status: RESOLVED | Noted: 2023-07-03 | Resolved: 2023-10-10

## 2023-10-10 PROBLEM — R03.0 ELEVATED BLOOD PRESSURE READING WITHOUT DIAGNOSIS OF HYPERTENSION: Status: RESOLVED | Noted: 2023-03-17 | Resolved: 2023-10-10

## 2023-10-10 PROBLEM — R05.9 COUGH, UNSPECIFIED: Status: RESOLVED | Noted: 2022-06-22 | Resolved: 2023-10-10

## 2023-10-10 PROBLEM — G20.A1 PARKINSON'S DISEASE: Status: RESOLVED | Noted: 2022-06-29 | Resolved: 2023-10-10

## 2023-10-10 PROBLEM — R73.9 HYPERGLYCEMIA: Status: RESOLVED | Noted: 2023-03-17 | Resolved: 2023-10-10

## 2023-10-10 PROBLEM — F43.21 GRIEF: Status: RESOLVED | Noted: 2023-03-17 | Resolved: 2023-10-10

## 2023-10-10 PROBLEM — E67.3 HYPERVITAMINOSIS D: Status: RESOLVED | Noted: 2023-03-17 | Resolved: 2023-10-10

## 2023-10-10 PROBLEM — E55.9 VITAMIN D DEFICIENCY: Status: RESOLVED | Noted: 2023-03-17 | Resolved: 2023-10-10

## 2023-10-10 PROBLEM — G89.18 ACUTE POST-OPERATIVE PAIN: Status: RESOLVED | Noted: 2019-02-19 | Resolved: 2023-10-10

## 2023-10-11 ENCOUNTER — OFFICE VISIT (OUTPATIENT)
Dept: NEUROLOGY | Age: 73
End: 2023-10-11
Payer: MEDICARE

## 2023-10-11 VITALS
SYSTOLIC BLOOD PRESSURE: 118 MMHG | BODY MASS INDEX: 20.18 KG/M2 | WEIGHT: 125 LBS | HEART RATE: 78 BPM | DIASTOLIC BLOOD PRESSURE: 62 MMHG

## 2023-10-11 DIAGNOSIS — G95.9 DISEASE OF SPINAL CORD (HCC): ICD-10-CM

## 2023-10-11 DIAGNOSIS — R25.1 TREMOR: ICD-10-CM

## 2023-10-11 DIAGNOSIS — R53.82 CHRONIC FATIGUE: ICD-10-CM

## 2023-10-11 DIAGNOSIS — G35 MS (MULTIPLE SCLEROSIS) (HCC): Primary | ICD-10-CM

## 2023-10-11 PROCEDURE — G8427 DOCREV CUR MEDS BY ELIG CLIN: HCPCS | Performed by: PSYCHIATRY & NEUROLOGY

## 2023-10-11 PROCEDURE — 1123F ACP DISCUSS/DSCN MKR DOCD: CPT | Performed by: PSYCHIATRY & NEUROLOGY

## 2023-10-11 PROCEDURE — G8484 FLU IMMUNIZE NO ADMIN: HCPCS | Performed by: PSYCHIATRY & NEUROLOGY

## 2023-10-11 PROCEDURE — 3017F COLORECTAL CA SCREEN DOC REV: CPT | Performed by: PSYCHIATRY & NEUROLOGY

## 2023-10-11 PROCEDURE — G8420 CALC BMI NORM PARAMETERS: HCPCS | Performed by: PSYCHIATRY & NEUROLOGY

## 2023-10-11 PROCEDURE — 1036F TOBACCO NON-USER: CPT | Performed by: PSYCHIATRY & NEUROLOGY

## 2023-10-11 PROCEDURE — 1090F PRES/ABSN URINE INCON ASSESS: CPT | Performed by: PSYCHIATRY & NEUROLOGY

## 2023-10-11 PROCEDURE — 99214 OFFICE O/P EST MOD 30 MIN: CPT | Performed by: PSYCHIATRY & NEUROLOGY

## 2023-10-11 PROCEDURE — G8399 PT W/DXA RESULTS DOCUMENT: HCPCS | Performed by: PSYCHIATRY & NEUROLOGY

## 2023-10-11 RX ORDER — MELOXICAM 7.5 MG/1
7.5 TABLET ORAL DAILY PRN
COMMUNITY

## 2023-10-11 ASSESSMENT — ENCOUNTER SYMPTOMS
CHOKING: 0
SHORTNESS OF BREATH: 0
PHOTOPHOBIA: 0
NAUSEA: 0
VOMITING: 0
COLOR CHANGE: 0
TROUBLE SWALLOWING: 0
BACK PAIN: 0

## 2023-10-12 ENCOUNTER — TELEPHONE (OUTPATIENT)
Dept: NEUROLOGY | Age: 73
End: 2023-10-12

## 2023-10-12 NOTE — TELEPHONE ENCOUNTER
Message from pt:     . ..for my last visit. It usually takes more than three visits to know that I have found the right doctor for my MS support. It is with relief and gratitude to know that you are someone who not only has the skilled expertise, but the ability to listen and work with me as the patient, which often many physicians find hard to do. I have interacted with so many neurologists over so many years and at this juncture in the course of my illness, It is good to know I am not looking anymore but have landed in the right place at the right time.  Omaira Atkins

## 2023-10-13 RX ORDER — METHYLPREDNISOLONE 4 MG/1
TABLET ORAL
Qty: 1 KIT | Refills: 0 | Status: SHIPPED | OUTPATIENT
Start: 2023-10-13 | End: 2023-10-18

## 2023-10-30 NOTE — PROGRESS NOTES
SUBJECTIVE:  76-year-old woman seen in follow-up visit for her multiple sclerosis patient has ongoing functional decline increasing tremors and increasing weakness. Has had falls unsteadiness. Oral intake has been poor but maintaining her weight. ROS: Worsening tremors in upper extremities  The rest of the 14 point ROS negative    PHYSICAL EXAM: VSS per facility record  Pupils reactive oral mucosa moist chest no crackles no wheezing cardiovascular showed a regular neurologically tremoring with some choreatic movements    ASSESSMENT & PLAN:   Diagnosis Orders   1. MS (multiple sclerosis) ([address])        2. Inflammation of right sacroiliac joint ([address])        3. Tremor        4. Weight loss          Continue supportive care at this time. Anti-inflammatory as needed. Follow-up with neurology as needed. Patient has ongoing decline intra-abdominal cirrhosis but remains cognitively intact this time notes no skin breakdown. Still relatively functionally dependent within the assisted living            Past Medical History:   Diagnosis Date    Chronic pain syndrome     Multiple sclerosis (HCC)     Orthostatic hypotension     Osteoporosis 05/29/2018    age-related w/o current pathological fracture    Other lack of coordination     Spinal stenosis, cervical region     Unspecified retinal detachment with retinal break, left eye          No past surgical history on file. Current Outpatient Medications on File Prior to Visit   Medication Sig Dispense Refill    Ofatumumab (KESIMPTA) 20 MG/0.4ML SOAJ Initial dosing of 20 mg by subcutaneous injection at weeks 0, 1, and 2, followed by subsequent dosing of 20 mg by subcutaneous injection once monthly starting at week 4 3 Adjustable Dose Pre-filled Pen Syringe 0    gabapentin (NEURONTIN) 100 MG capsule Take 1 capsule by mouth 2 times daily for 30 days.  60 capsule 0    Cholecalciferol (VITAMIN D3) 1.25 MG (51912 UT) CAPS take 1 capsule Orally once a week 90 days

## 2024-02-06 ENCOUNTER — OFFICE VISIT (OUTPATIENT)
Dept: GERIATRIC MEDICINE | Age: 74
End: 2024-02-06

## 2024-02-06 DIAGNOSIS — M46.1 INFLAMMATION OF RIGHT SACROILIAC JOINT (HCC): ICD-10-CM

## 2024-02-06 DIAGNOSIS — G35 MS (MULTIPLE SCLEROSIS) (HCC): ICD-10-CM

## 2024-02-06 DIAGNOSIS — G95.9 DISEASE OF SPINAL CORD (HCC): ICD-10-CM

## 2024-02-29 NOTE — PROGRESS NOTES
SUBJECTIVE:  This 73-year-old woman seen follow-up visit for her multiple sclerosis weakness confusion weight loss patient has been unsteady concern for falls.  Patient is progressing her MS at this time has reached a plateau is up in motorized wheelchair at this time      ROS: Denies chest pain palpitation  The rest of the 14 point ROS negative    PHYSICAL EXAM: VSS per facility record  Pupils are small they are reactive oral mucosa moist no thrush chest showed no crackles no wheezing cardiovascular showed a regular rate abdomen soft nontender extremity trace edema    ASSESSMENT & PLAN:   Diagnosis Orders   1. MS (multiple sclerosis) (HCC)        2. Disease of spinal cord (HCC)        3. Inflammation of right sacroiliac joint (HCC)          Continue supportive care follow-up with neurology as needed.  Reorientation as able acute pain crisis continue monitor skin for breakdown            Past Medical History:   Diagnosis Date    Chronic pain syndrome     Multiple sclerosis (HCC)     Orthostatic hypotension     Osteoporosis 05/29/2018    age-related w/o current pathological fracture    Other lack of coordination     Spinal stenosis, cervical region     Unspecified retinal detachment with retinal break, left eye          No past surgical history on file.      Current Outpatient Medications on File Prior to Visit   Medication Sig Dispense Refill    meloxicam (MOBIC) 7.5 MG tablet Take 1 tablet by mouth daily as needed for Pain      Ofatumumab (KESIMPTA) 20 MG/0.4ML SOAJ Initial dosing of 20 mg by subcutaneous injection at weeks 0, 1, and 2, followed by subsequent dosing of 20 mg by subcutaneous injection once monthly starting at week 4 3 Adjustable Dose Pre-filled Pen Syringe 0    gabapentin (NEURONTIN) 100 MG capsule Take 1 capsule by mouth 2 times daily for 30 days. 60 capsule 0    Cholecalciferol (VITAMIN D3) 1.25 MG (14462 UT) CAPS take 1 capsule Orally once a week 90 days      fludrocortisone (FLORINEF) 0.1 MG

## 2024-03-01 VITALS
WEIGHT: 124 LBS | SYSTOLIC BLOOD PRESSURE: 118 MMHG | DIASTOLIC BLOOD PRESSURE: 58 MMHG | HEART RATE: 70 BPM | BODY MASS INDEX: 20.01 KG/M2

## 2024-04-08 LAB
ANION GAP SERPL CALCULATED.3IONS-SCNC: 9 MEQ/L (ref 9–15)
BUN SERPL-MCNC: 15 MG/DL (ref 8–23)
CALCIUM SERPL-MCNC: 9.6 MG/DL (ref 8.5–9.9)
CHLORIDE SERPL-SCNC: 106 MEQ/L (ref 95–107)
CO2 SERPL-SCNC: 29 MEQ/L (ref 20–31)
CREAT SERPL-MCNC: 0.56 MG/DL (ref 0.5–0.9)
GLUCOSE SERPL-MCNC: 86 MG/DL (ref 70–99)
POTASSIUM SERPL-SCNC: 4.8 MEQ/L (ref 3.4–4.9)
SODIUM SERPL-SCNC: 144 MEQ/L (ref 135–144)

## 2024-04-10 ENCOUNTER — OFFICE VISIT (OUTPATIENT)
Dept: NEUROLOGY | Age: 74
End: 2024-04-10
Payer: MEDICARE

## 2024-04-10 VITALS
SYSTOLIC BLOOD PRESSURE: 133 MMHG | BODY MASS INDEX: 20.01 KG/M2 | HEART RATE: 81 BPM | DIASTOLIC BLOOD PRESSURE: 73 MMHG | WEIGHT: 124 LBS

## 2024-04-10 DIAGNOSIS — G95.9 DISEASE OF SPINAL CORD (HCC): ICD-10-CM

## 2024-04-10 DIAGNOSIS — R27.0 ATAXIA: ICD-10-CM

## 2024-04-10 DIAGNOSIS — G35 MS (MULTIPLE SCLEROSIS) (HCC): Primary | ICD-10-CM

## 2024-04-10 DIAGNOSIS — G62.9 POLYNEUROPATHY, UNSPECIFIED: ICD-10-CM

## 2024-04-10 PROCEDURE — 1036F TOBACCO NON-USER: CPT | Performed by: PSYCHIATRY & NEUROLOGY

## 2024-04-10 PROCEDURE — G8427 DOCREV CUR MEDS BY ELIG CLIN: HCPCS | Performed by: PSYCHIATRY & NEUROLOGY

## 2024-04-10 PROCEDURE — 1123F ACP DISCUSS/DSCN MKR DOCD: CPT | Performed by: PSYCHIATRY & NEUROLOGY

## 2024-04-10 PROCEDURE — 99214 OFFICE O/P EST MOD 30 MIN: CPT | Performed by: PSYCHIATRY & NEUROLOGY

## 2024-04-10 PROCEDURE — 1090F PRES/ABSN URINE INCON ASSESS: CPT | Performed by: PSYCHIATRY & NEUROLOGY

## 2024-04-10 PROCEDURE — G8399 PT W/DXA RESULTS DOCUMENT: HCPCS | Performed by: PSYCHIATRY & NEUROLOGY

## 2024-04-10 PROCEDURE — G8420 CALC BMI NORM PARAMETERS: HCPCS | Performed by: PSYCHIATRY & NEUROLOGY

## 2024-04-10 PROCEDURE — 3017F COLORECTAL CA SCREEN DOC REV: CPT | Performed by: PSYCHIATRY & NEUROLOGY

## 2024-04-10 RX ORDER — NEOMYCIN/POLYMYXIN B/DEXAMETHA 3.5-10K-.1
1 OINTMENT (GRAM) OPHTHALMIC (EYE) NIGHTLY
COMMUNITY
Start: 2024-03-01

## 2024-04-10 RX ORDER — SODIUM CHLORIDE 5 %
OINTMENT (GRAM) OPHTHALMIC (EYE)
COMMUNITY

## 2024-04-10 NOTE — PROGRESS NOTES
Topics Concern    Not on file   Social History Narrative    Not on file     Social Determinants of Health     Financial Resource Strain: Not on file   Food Insecurity: Not on file   Transportation Needs: Not on file   Physical Activity: Not on file   Stress: Not on file   Social Connections: Not on file   Intimate Partner Violence: Not on file   Housing Stability: Not on file     No family history on file.  Allergies   Allergen Reactions    Sulfa Antibiotics Rash       Current Outpatient Medications   Medication Sig Dispense Refill    MAXITROL 3.5-35457-5.1 OINT Apply 1 Application to eye nightly      sodium chloride 5 % ophthalmic ointment Use 1 application in both eyes daily at bedtime.      gabapentin (NEURONTIN) 100 MG capsule Take 1 capsule by mouth 2 times daily for 30 days. 60 capsule 0    Cholecalciferol (VITAMIN D3) 1.25 MG (22464 UT) CAPS take 1 capsule Orally once a week 90 days      fludrocortisone (FLORINEF) 0.1 MG tablet Take 1 tablet by mouth daily       No current facility-administered medications for this visit.         Review of Systems   Constitutional:  Negative for fever.   HENT:  Negative for ear pain, tinnitus and trouble swallowing.    Eyes:  Negative for photophobia and visual disturbance.   Respiratory:  Negative for choking and shortness of breath.    Cardiovascular:  Negative for chest pain and palpitations.   Gastrointestinal:  Negative for nausea and vomiting.   Musculoskeletal:  Positive for gait problem. Negative for back pain, joint swelling, myalgias, neck pain and neck stiffness.   Skin:  Negative for color change.   Allergic/Immunologic: Negative for food allergies.   Neurological:  Negative for dizziness, tremors, seizures, syncope, facial asymmetry, speech difficulty, weakness, light-headedness, numbness and headaches.   Psychiatric/Behavioral:  Negative for behavioral problems, confusion, hallucinations and sleep disturbance.        Objective:   /73 (Site: Left Upper Arm,

## 2024-06-18 ENCOUNTER — OFFICE VISIT (OUTPATIENT)
Dept: GERIATRIC MEDICINE | Age: 74
End: 2024-06-18

## 2024-06-18 DIAGNOSIS — G35 MS (MULTIPLE SCLEROSIS) (HCC): Primary | ICD-10-CM

## 2024-06-18 DIAGNOSIS — M15.9 OSTEOARTHRITIS OF MULTIPLE JOINTS, UNSPECIFIED OSTEOARTHRITIS TYPE: ICD-10-CM

## 2024-06-18 DIAGNOSIS — G89.4 CHRONIC PAIN SYNDROME: ICD-10-CM

## 2024-07-02 ENCOUNTER — OFFICE VISIT (OUTPATIENT)
Dept: GERIATRIC MEDICINE | Age: 74
End: 2024-07-02
Payer: MEDICARE

## 2024-07-02 DIAGNOSIS — I83.813 VARICOSE VEINS OF BOTH LOWER EXTREMITIES WITH PAIN: Primary | ICD-10-CM

## 2024-07-02 DIAGNOSIS — G35 MS (MULTIPLE SCLEROSIS) (HCC): ICD-10-CM

## 2024-07-02 PROCEDURE — 3017F COLORECTAL CA SCREEN DOC REV: CPT | Performed by: INTERNAL MEDICINE

## 2024-07-02 PROCEDURE — 1090F PRES/ABSN URINE INCON ASSESS: CPT | Performed by: INTERNAL MEDICINE

## 2024-07-02 PROCEDURE — 99349 HOME/RES VST EST MOD MDM 40: CPT | Performed by: INTERNAL MEDICINE

## 2024-07-02 PROCEDURE — 1036F TOBACCO NON-USER: CPT | Performed by: INTERNAL MEDICINE

## 2024-07-02 PROCEDURE — G8420 CALC BMI NORM PARAMETERS: HCPCS | Performed by: INTERNAL MEDICINE

## 2024-07-02 PROCEDURE — 1123F ACP DISCUSS/DSCN MKR DOCD: CPT | Performed by: INTERNAL MEDICINE

## 2024-07-17 NOTE — PROGRESS NOTES
SUBJECTIVE:        ROS:  The rest of the 14 point ROS negative    PHYSICAL EXAM: VSS per facility record      ASSESSMENT & PLAN:   Diagnosis Orders   1. MS (multiple sclerosis) ([facility])        2. Osteoarthritis of multiple joints, unspecified osteoarthritis type        3. Chronic pain syndrome                      Past Medical History:   Diagnosis Date    Chronic pain syndrome     Multiple sclerosis (HCC)     Orthostatic hypotension     Osteoporosis 05/29/2018    age-related w/o current pathological fracture    Other lack of coordination     Spinal stenosis, cervical region     Unspecified retinal detachment with retinal break, left eye          No past surgical history on file.      Current Outpatient Medications on File Prior to Visit   Medication Sig Dispense Refill    MAXITROL 3.5-17020-5.1 OINT Apply 1 Application to eye nightly      sodium chloride 5 % ophthalmic ointment Use 1 application in both eyes daily at bedtime.      gabapentin (NEURONTIN) 100 MG capsule Take 1 capsule by mouth 2 times daily for 30 days. 60 capsule 0    Cholecalciferol (VITAMIN D3) 1.25 MG (36137 UT) CAPS take 1 capsule Orally once a week 90 days      fludrocortisone (FLORINEF) 0.1 MG tablet Take 1 tablet by mouth daily       No current facility-administered medications on file prior to visit.         No family history on file.    Social History     Socioeconomic History    Marital status:      Spouse name: Not on file    Number of children: Not on file    Years of education: Not on file    Highest education level: Not on file   Occupational History    Not on file   Tobacco Use    Smoking status: Never    Smokeless tobacco: Never   Substance and Sexual Activity    Alcohol use: Never    Drug use: Yes     Types: Marijuana (Weed)     Comment: pt has medical marijuana card    Sexual activity: Not on file   Other Topics Concern    Not on file   Social History Narrative    Not on file     Social Determinants of Health     Financial

## 2024-07-31 NOTE — PROGRESS NOTES
SUBJECTIVE:        ROS:  The rest of the 14 point ROS negative    PHYSICAL EXAM: VSS per facility record      ASSESSMENT & PLAN:   Diagnosis Orders   1. Varicose veins of both lower extremities with pain        2. MS (multiple sclerosis) ([facility])                      Past Medical History:   Diagnosis Date    Chronic pain syndrome     Multiple sclerosis (HCC)     Orthostatic hypotension     Osteoporosis 05/29/2018    age-related w/o current pathological fracture    Other lack of coordination     Spinal stenosis, cervical region     Unspecified retinal detachment with retinal break, left eye          No past surgical history on file.      Current Outpatient Medications on File Prior to Visit   Medication Sig Dispense Refill    MAXITROL 3.5-03792-7.1 OINT Apply 1 Application to eye nightly      sodium chloride 5 % ophthalmic ointment Use 1 application in both eyes daily at bedtime.      gabapentin (NEURONTIN) 100 MG capsule Take 1 capsule by mouth 2 times daily for 30 days. 60 capsule 0    Cholecalciferol (VITAMIN D3) 1.25 MG (99168 UT) CAPS take 1 capsule Orally once a week 90 days      fludrocortisone (FLORINEF) 0.1 MG tablet Take 1 tablet by mouth daily       No current facility-administered medications on file prior to visit.         No family history on file.    Social History     Socioeconomic History    Marital status:      Spouse name: Not on file    Number of children: Not on file    Years of education: Not on file    Highest education level: Not on file   Occupational History    Not on file   Tobacco Use    Smoking status: Never    Smokeless tobacco: Never   Substance and Sexual Activity    Alcohol use: Never    Drug use: Yes     Types: Marijuana (Weed)     Comment: pt has medical marijuana card    Sexual activity: Not on file   Other Topics Concern    Not on file   Social History Narrative    Not on file     Social Determinants of Health     Financial Resource Strain: Not on file   Food Insecurity: Not

## 2024-09-30 LAB
ALBUMIN SERPL-MCNC: 4.2 G/DL (ref 3.5–4.6)
ALP SERPL-CCNC: 82 U/L (ref 40–130)
ALT SERPL-CCNC: 15 U/L (ref 0–33)
ANION GAP SERPL CALCULATED.3IONS-SCNC: 10 MEQ/L (ref 9–15)
AST SERPL-CCNC: 16 U/L (ref 0–35)
BILIRUB SERPL-MCNC: 0.5 MG/DL (ref 0.2–0.7)
BUN SERPL-MCNC: 13 MG/DL (ref 8–23)
CALCIUM SERPL-MCNC: 9.2 MG/DL (ref 8.5–9.9)
CHLORIDE SERPL-SCNC: 106 MEQ/L (ref 95–107)
CHOLEST SERPL-MCNC: 148 MG/DL (ref 0–199)
CO2 SERPL-SCNC: 28 MEQ/L (ref 20–31)
CREAT SERPL-MCNC: 0.52 MG/DL (ref 0.5–0.9)
ERYTHROCYTE [DISTWIDTH] IN BLOOD BY AUTOMATED COUNT: 12.7 % (ref 11.5–14.5)
GLOBULIN SER CALC-MCNC: 2.1 G/DL (ref 2.3–3.5)
GLUCOSE SERPL-MCNC: 77 MG/DL (ref 70–99)
HCT VFR BLD AUTO: 45.1 % (ref 37–47)
HDLC SERPL-MCNC: 47 MG/DL (ref 40–59)
HGB BLD-MCNC: 14.7 G/DL (ref 12–16)
LDLC SERPL CALC-MCNC: 87 MG/DL (ref 0–129)
MCH RBC QN AUTO: 31.1 PG (ref 27–31.3)
MCHC RBC AUTO-ENTMCNC: 32.6 % (ref 33–37)
MCV RBC AUTO: 95.3 FL (ref 79.4–94.8)
PLATELET # BLD AUTO: 256 K/UL (ref 130–400)
POTASSIUM SERPL-SCNC: 3.8 MEQ/L (ref 3.4–4.9)
PROT SERPL-MCNC: 6.3 G/DL (ref 6.3–8)
RBC # BLD AUTO: 4.73 M/UL (ref 4.2–5.4)
SODIUM SERPL-SCNC: 144 MEQ/L (ref 135–144)
TRIGL SERPL-MCNC: 68 MG/DL (ref 0–150)
VITAMIN D 25-HYDROXY: 49.6 NG/ML (ref 30–100)
WBC # BLD AUTO: 4.1 K/UL (ref 4.8–10.8)

## 2024-10-09 ENCOUNTER — OFFICE VISIT (OUTPATIENT)
Dept: NEUROLOGY | Age: 74
End: 2024-10-09
Payer: MEDICARE

## 2024-10-09 VITALS
BODY MASS INDEX: 20.5 KG/M2 | WEIGHT: 127 LBS | DIASTOLIC BLOOD PRESSURE: 56 MMHG | HEART RATE: 71 BPM | SYSTOLIC BLOOD PRESSURE: 121 MMHG

## 2024-10-09 DIAGNOSIS — R25.1 TREMOR: ICD-10-CM

## 2024-10-09 DIAGNOSIS — G62.9 POLYNEUROPATHY, UNSPECIFIED: ICD-10-CM

## 2024-10-09 DIAGNOSIS — G95.9 DISEASE OF SPINAL CORD (HCC): ICD-10-CM

## 2024-10-09 DIAGNOSIS — G35 MS (MULTIPLE SCLEROSIS) (HCC): Primary | ICD-10-CM

## 2024-10-09 PROBLEM — Z96.1 PSEUDOPHAKIA: Status: ACTIVE | Noted: 2018-08-01

## 2024-10-09 PROBLEM — Z20.822 CONTACT WITH AND (SUSPECTED) EXPOSURE TO COVID-19: Status: RESOLVED | Noted: 2022-06-22 | Resolved: 2024-10-09

## 2024-10-09 PROBLEM — Z98.890 HISTORY OF REPAIR OF RETINAL DEFECT BY LASER PHOTOCOAGULATION: Status: ACTIVE | Noted: 2018-08-01

## 2024-10-09 PROCEDURE — G8420 CALC BMI NORM PARAMETERS: HCPCS | Performed by: PSYCHIATRY & NEUROLOGY

## 2024-10-09 PROCEDURE — 1036F TOBACCO NON-USER: CPT | Performed by: PSYCHIATRY & NEUROLOGY

## 2024-10-09 PROCEDURE — G8399 PT W/DXA RESULTS DOCUMENT: HCPCS | Performed by: PSYCHIATRY & NEUROLOGY

## 2024-10-09 PROCEDURE — 3017F COLORECTAL CA SCREEN DOC REV: CPT | Performed by: PSYCHIATRY & NEUROLOGY

## 2024-10-09 PROCEDURE — 99214 OFFICE O/P EST MOD 30 MIN: CPT | Performed by: PSYCHIATRY & NEUROLOGY

## 2024-10-09 PROCEDURE — G8427 DOCREV CUR MEDS BY ELIG CLIN: HCPCS | Performed by: PSYCHIATRY & NEUROLOGY

## 2024-10-09 PROCEDURE — G8484 FLU IMMUNIZE NO ADMIN: HCPCS | Performed by: PSYCHIATRY & NEUROLOGY

## 2024-10-09 PROCEDURE — 1090F PRES/ABSN URINE INCON ASSESS: CPT | Performed by: PSYCHIATRY & NEUROLOGY

## 2024-10-09 PROCEDURE — 1123F ACP DISCUSS/DSCN MKR DOCD: CPT | Performed by: PSYCHIATRY & NEUROLOGY

## 2024-10-09 RX ORDER — METHYLPREDNISOLONE 4 MG
TABLET, DOSE PACK ORAL
Qty: 1 KIT | Refills: 0 | Status: SHIPPED | OUTPATIENT
Start: 2024-10-09 | End: 2024-10-15

## 2024-10-09 NOTE — PROGRESS NOTES
Subjective:      Patient ID: Marlys Shannon is a 74 y.o. female who presents today for:  Chief Complaint   Patient presents with    6 Month Follow-Up     Pt states things are going very well. No questions or concerns at this time        HPI 74-year-old right-handed female with history of multiple sclerosis.  Patient has both cerebral and spinal disease though more notable with paraparesis spinal disease.  Again recapping her disease patient was on neuro immunomodulators at age 19 and was treated and Novantrone with significant side effects likely to be cardiac.  She was then on IVIG and Copaxone and rituximab.  We further did discuss Ocrevus and Kesimpta.  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  Patient is managing with peptides and nutritional support.  We have not had to treat her with steroids of recent.  Patient has some new symptoms which may not all be MS.  She is having some tingling over the dorsum of her hand at times when she moves her hand.  She is also feeling somewhat weaker in the legs left more than right.  She is not developed any difficulty swallowing but has some difficulty in speech and she tells me this has been going on for quite some time when she had some less control on her tongue.  She reports no skin changes    Past Medical History:   Diagnosis Date    Chronic pain syndrome     Multiple sclerosis (HCC)     Orthostatic hypotension     Osteoporosis 05/29/2018    age-related w/o current pathological fracture    Other lack of coordination     Spinal stenosis, cervical region     Unspecified retinal detachment with retinal break, left eye      No past surgical history on file.  Social History     Socioeconomic History    Marital status:      Spouse name: Not on file    Number of children: Not on file    Years of education: Not on file    Highest education level: Not on file   Occupational History    Not on file   Tobacco Use    Smoking status: Never    Smokeless tobacco: Never

## 2024-10-15 ENCOUNTER — OFFICE VISIT (OUTPATIENT)
Dept: GERIATRIC MEDICINE | Age: 74
End: 2024-10-15
Payer: MEDICARE

## 2024-10-15 DIAGNOSIS — M54.50 CHRONIC LOW BACK PAIN WITHOUT SCIATICA, UNSPECIFIED BACK PAIN LATERALITY: ICD-10-CM

## 2024-10-15 DIAGNOSIS — G35 MS (MULTIPLE SCLEROSIS) (HCC): Primary | ICD-10-CM

## 2024-10-15 DIAGNOSIS — G89.29 CHRONIC LOW BACK PAIN WITHOUT SCIATICA, UNSPECIFIED BACK PAIN LATERALITY: ICD-10-CM

## 2024-10-15 PROCEDURE — 3017F COLORECTAL CA SCREEN DOC REV: CPT | Performed by: INTERNAL MEDICINE

## 2024-10-15 PROCEDURE — 1123F ACP DISCUSS/DSCN MKR DOCD: CPT | Performed by: INTERNAL MEDICINE

## 2024-10-15 PROCEDURE — 1036F TOBACCO NON-USER: CPT | Performed by: INTERNAL MEDICINE

## 2024-10-15 PROCEDURE — G8484 FLU IMMUNIZE NO ADMIN: HCPCS | Performed by: INTERNAL MEDICINE

## 2024-10-15 PROCEDURE — 1090F PRES/ABSN URINE INCON ASSESS: CPT | Performed by: INTERNAL MEDICINE

## 2024-10-15 PROCEDURE — G8420 CALC BMI NORM PARAMETERS: HCPCS | Performed by: INTERNAL MEDICINE

## 2024-10-15 PROCEDURE — 99347 HOME/RES VST EST SF MDM 20: CPT | Performed by: INTERNAL MEDICINE

## 2024-11-13 NOTE — PROGRESS NOTES
SUBJECTIVE:  Patient seen today at the request of nursing staff patient has been functionally stable in a plateau after recent decline.  Patient is up in her motorized wheelchair today patient is more engaged cognitively.  Patient is pain-free at this time although she does demonstrate a noted loss of function over the last 6 months to a year.  No new tremoring.  Patient anticipates ongoing decline      ROS: Denies chest pain palpitations assessment regular regularity of bowel  The rest of the 14 point ROS negative    PHYSICAL EXAM: VSS per facility record  Alert and orient x 3 pupils are reactive oral mucosa moist no dysarthria chest showed no crackles no wheezing abdomen soft nontender  Patient does have some bilateral upper extremity tremor    ASSESSMENT & PLAN:   Diagnosis Orders   1. MS (multiple sclerosis) ([facility])        2. Chronic low back pain without sciatica, unspecified back pain laterality          Continue to follow-up with her neurologist.  Patient functionally stable continue with holistic interventions.  Monitor skin for breakdown no acute pain crisis limit opiate agents as able.            Past Medical History:   Diagnosis Date    Chronic pain syndrome     Multiple sclerosis (HCC)     Orthostatic hypotension     Osteoporosis 05/29/2018    age-related w/o current pathological fracture    Other lack of coordination     Spinal stenosis, cervical region     Unspecified retinal detachment with retinal break, left eye          No past surgical history on file.      Current Outpatient Medications on File Prior to Visit   Medication Sig Dispense Refill    MAXITROL 3.5-97139-2.1 OINT Apply 1 Application to eye nightly      sodium chloride 5 % ophthalmic ointment Use 1 application in both eyes daily at bedtime.      gabapentin (NEURONTIN) 100 MG capsule Take 1 capsule by mouth 2 times daily for 30 days. 60 capsule 0    Cholecalciferol (VITAMIN D3) 1.25 MG (47699 UT) CAPS take 1 capsule Orally once a week 90

## 2024-12-19 ENCOUNTER — TELEPHONE (OUTPATIENT)
Dept: NEUROLOGY | Age: 74
End: 2024-12-19

## 2024-12-19 NOTE — TELEPHONE ENCOUNTER
Patient called and states that she is having a flare up of her MS. She is requesting medrol dose pack. She last had steroids in October    Please advise if okay to do     [facility]

## 2024-12-20 RX ORDER — METHYLPREDNISOLONE 4 MG/1
TABLET ORAL
Qty: 1 KIT | Refills: 0 | Status: SHIPPED | OUTPATIENT
Start: 2024-12-20 | End: 2024-12-25

## 2024-12-20 NOTE — TELEPHONE ENCOUNTER
Per phone encounter. Please advise        Requesting medication refill. Please approve or deny this request.    Rx requested:  Requested Prescriptions     Pending Prescriptions Disp Refills    methylPREDNISolone (MEDROL DOSEPACK) 4 MG tablet 1 kit 0     Sig: Take by mouth.         Last Office Visit:   10/9/2024      Next Visit Date:  Future Appointments   Date Time Provider Department Center   4/9/2025  1:45 PM Bradley Rodriguez MD [place] [facility] -                 
5

## 2024-12-23 ENCOUNTER — NURSE ONLY (OUTPATIENT)
Dept: GERIATRIC MEDICINE | Age: 74
End: 2024-12-23

## 2024-12-30 ASSESSMENT — PATIENT HEALTH QUESTIONNAIRE - PHQ9
SUM OF ALL RESPONSES TO PHQ QUESTIONS 1-9: 0
SUM OF ALL RESPONSES TO PHQ9 QUESTIONS 1 & 2: 0
2. FEELING DOWN, DEPRESSED OR HOPELESS: NOT AT ALL
SUM OF ALL RESPONSES TO PHQ QUESTIONS 1-9: 0
1. LITTLE INTEREST OR PLEASURE IN DOING THINGS: NOT AT ALL
SUM OF ALL RESPONSES TO PHQ QUESTIONS 1-9: 0
SUM OF ALL RESPONSES TO PHQ QUESTIONS 1-9: 0

## 2025-03-28 RX ORDER — METHYLPREDNISOLONE 4 MG/1
TABLET ORAL
Qty: 1 KIT | Refills: 0 | Status: SHIPPED | OUTPATIENT
Start: 2025-03-28 | End: 2025-04-03

## 2025-03-28 NOTE — TELEPHONE ENCOUNTER
Patient is  requesting medication refill. Please approve or deny this request.    Rx requested:  Requested Prescriptions     Pending Prescriptions Disp Refills    methylPREDNISolone (MEDROL DOSEPACK) 4 MG tablet 1 kit 0     Sig: Take by mouth.         Last Office Visit:   10/9/2024      Next Visit Date:  Future Appointments   Date Time Provider Department Center   4/9/2025  1:45 PM Bradley Rodriguez MD [place] [facility] -

## 2025-07-30 ENCOUNTER — OFFICE VISIT (OUTPATIENT)
Age: 75
End: 2025-07-30
Payer: MEDICARE

## 2025-07-30 VITALS
SYSTOLIC BLOOD PRESSURE: 110 MMHG | DIASTOLIC BLOOD PRESSURE: 66 MMHG | HEART RATE: 68 BPM | WEIGHT: 127 LBS | BODY MASS INDEX: 20.5 KG/M2

## 2025-07-30 DIAGNOSIS — G95.9 DISEASE OF SPINAL CORD (HCC): ICD-10-CM

## 2025-07-30 DIAGNOSIS — R20.2 PARESTHESIA: ICD-10-CM

## 2025-07-30 DIAGNOSIS — R27.0 ATAXIA: ICD-10-CM

## 2025-07-30 DIAGNOSIS — R25.1 TREMOR: ICD-10-CM

## 2025-07-30 DIAGNOSIS — G35 MULTIPLE SCLEROSIS (HCC): Primary | ICD-10-CM

## 2025-07-30 PROCEDURE — 99214 OFFICE O/P EST MOD 30 MIN: CPT | Performed by: PSYCHIATRY & NEUROLOGY

## 2025-07-30 PROCEDURE — 1125F AMNT PAIN NOTED PAIN PRSNT: CPT | Performed by: PSYCHIATRY & NEUROLOGY

## 2025-07-30 PROCEDURE — G8399 PT W/DXA RESULTS DOCUMENT: HCPCS | Performed by: PSYCHIATRY & NEUROLOGY

## 2025-07-30 PROCEDURE — G8420 CALC BMI NORM PARAMETERS: HCPCS | Performed by: PSYCHIATRY & NEUROLOGY

## 2025-07-30 PROCEDURE — 3017F COLORECTAL CA SCREEN DOC REV: CPT | Performed by: PSYCHIATRY & NEUROLOGY

## 2025-07-30 PROCEDURE — 1123F ACP DISCUSS/DSCN MKR DOCD: CPT | Performed by: PSYCHIATRY & NEUROLOGY

## 2025-07-30 PROCEDURE — 1036F TOBACCO NON-USER: CPT | Performed by: PSYCHIATRY & NEUROLOGY

## 2025-07-30 PROCEDURE — 1159F MED LIST DOCD IN RCRD: CPT | Performed by: PSYCHIATRY & NEUROLOGY

## 2025-07-30 PROCEDURE — 1090F PRES/ABSN URINE INCON ASSESS: CPT | Performed by: PSYCHIATRY & NEUROLOGY

## 2025-07-30 PROCEDURE — G8427 DOCREV CUR MEDS BY ELIG CLIN: HCPCS | Performed by: PSYCHIATRY & NEUROLOGY

## 2025-07-30 RX ORDER — PSEUDOEPHEDRINE HCL 30 MG
100 TABLET ORAL 2 TIMES DAILY
COMMUNITY
End: 2025-07-30

## 2025-07-30 RX ORDER — ACETAMINOPHEN 325 MG/1
325 TABLET ORAL EVERY 6 HOURS PRN
COMMUNITY

## 2025-07-30 RX ORDER — IBUPROFEN 600 MG/1
TABLET, FILM COATED ORAL
COMMUNITY
Start: 2025-07-21

## 2025-08-06 ENCOUNTER — TELEPHONE (OUTPATIENT)
Age: 75
End: 2025-08-06